# Patient Record
Sex: MALE | Race: WHITE | NOT HISPANIC OR LATINO | Employment: FULL TIME | RURAL
[De-identification: names, ages, dates, MRNs, and addresses within clinical notes are randomized per-mention and may not be internally consistent; named-entity substitution may affect disease eponyms.]

---

## 2018-03-24 ENCOUNTER — CONVERSION ENCOUNTER (OUTPATIENT)
Dept: FAMILY MEDICINE CLINIC | Facility: CLINIC | Age: 28
End: 2018-03-24

## 2018-03-24 LAB
ALBUMIN SERPL-MCNC: 5.1 G/DL (ref 3.6–5.1)
ALP SERPL-CCNC: 57 U/L (ref 40–115)
ALT SERPL-CCNC: 20 U/L (ref 9–46)
AMYLASE SERPL-CCNC: 33 U/L (ref 21–101)
AST SERPL-CCNC: 17 U/L (ref 10–40)
BASOPHILS # BLD AUTO: 27 CELLS/UL (ref 0–200)
BASOPHILS NFR BLD AUTO: 0.3 %
BILIRUB SERPL-MCNC: 0.6 MG/DL (ref 0.2–1.2)
BUN SERPL-MCNC: 18 MG/DL (ref 7–25)
BUN/CREAT SERPL: NORMAL (CALC) (ref 6–22)
CALCIUM SERPL-MCNC: 9.9 MG/DL (ref 8.6–10.3)
CHLORIDE SERPL-SCNC: 99 MMOL/L (ref 98–110)
CHOLEST SERPL-MCNC: 177 MG/DL
CHOLEST/HDLC SERPL: 6.1 (CALC)
CONV CO2: 28 MMOL/L (ref 20–31)
CONV TOTAL PROTEIN: 8 G/DL (ref 6.1–8.1)
CREAT UR-MCNC: 0.94 MG/DL (ref 0.6–1.35)
EOSINOPHIL # BLD AUTO: 1.3 %
EOSINOPHIL # BLD AUTO: 117 CELLS/UL (ref 15–500)
ERYTHROCYTE [DISTWIDTH] IN BLOOD BY AUTOMATED COUNT: 12.4 % (ref 11–15)
GLOBULIN UR ELPH-MCNC: 2.9 MG/DL (ref 1.9–3.7)
GLUCOSE UR QL: 94 MG/DL (ref 65–99)
HCT VFR BLD AUTO: 48.4 % (ref 38.5–50)
HDLC SERPL-MCNC: 29 MG/DL
HGB BLD-MCNC: 16.2 G/DL (ref 13.2–17.1)
INSULIN SERPL-ACNC: 1.8 (CALC) (ref 1–2.5)
LDLC SERPL CALC-MCNC: 117 MG/DL
LIPASE SERPL-CCNC: 28 U/L (ref 7–60)
LYMPHOCYTES # BLD AUTO: 2844 CELLS/UL (ref 850–3900)
LYMPHOCYTES NFR BLD AUTO: 31.6 %
MCH RBC QN AUTO: 27.8 PG (ref 27–33)
MCHC RBC AUTO-ENTMCNC: 33.5 G/DL (ref 32–36)
MCV RBC AUTO: 83 FL (ref 80–100)
MONOCYTES # BLD AUTO: 684 CELLS/UL (ref 200–950)
MONOCYTES NFR BLD AUTO: 7.6 %
NEUTROPHILS # BLD AUTO: 5328 CELLS/UL (ref 1500–7800)
NEUTROPHILS NFR BLD AUTO: 59.2 %
NONHDLC SERPL-MCNC: 148 MG/DL
PLATELET # BLD AUTO: 353 10*3/UL (ref 140–400)
PMV BLD AUTO: 10.2 FL (ref 7.5–12.5)
POTASSIUM SERPL-SCNC: 4 MMOL/L (ref 3.5–5.3)
RBC # BLD AUTO: 5.83 MILLION/UL (ref 4.2–5.8)
SODIUM SERPL-SCNC: 136 MMOL/L (ref 135–146)
TRIGL SERPL-MCNC: 191 MG/DL
TSH SERPL-ACNC: 1.59 MIU/L (ref 0.4–4.5)
WBC # BLD AUTO: 9 10*3/UL (ref 3.8–10.8)

## 2019-06-18 DIAGNOSIS — F32.0 CURRENT MILD EPISODE OF MAJOR DEPRESSIVE DISORDER WITHOUT PRIOR EPISODE (HCC): Primary | ICD-10-CM

## 2019-06-18 DIAGNOSIS — F32.89 OTHER DEPRESSION: ICD-10-CM

## 2019-06-19 RX ORDER — CITALOPRAM 20 MG/1
TABLET ORAL
Qty: 30 TABLET | Refills: 2 | Status: SHIPPED | OUTPATIENT
Start: 2019-06-19 | End: 2021-01-12 | Stop reason: SDUPTHER

## 2020-02-03 PROBLEM — F41.9 ANXIETY: Status: ACTIVE | Noted: 2020-02-03

## 2020-02-03 PROBLEM — F40.00 AGORAPHOBIA: Status: ACTIVE | Noted: 2020-02-03

## 2020-12-18 ENCOUNTER — OFFICE VISIT (OUTPATIENT)
Dept: FAMILY MEDICINE CLINIC | Facility: CLINIC | Age: 30
End: 2020-12-18

## 2020-12-18 VITALS
DIASTOLIC BLOOD PRESSURE: 98 MMHG | HEART RATE: 115 BPM | TEMPERATURE: 98.4 F | BODY MASS INDEX: 28.72 KG/M2 | OXYGEN SATURATION: 98 % | RESPIRATION RATE: 16 BRPM | SYSTOLIC BLOOD PRESSURE: 138 MMHG | WEIGHT: 200.6 LBS | HEIGHT: 70 IN

## 2020-12-18 DIAGNOSIS — I10 ESSENTIAL HYPERTENSION: Primary | ICD-10-CM

## 2020-12-18 PROCEDURE — 99213 OFFICE O/P EST LOW 20 MIN: CPT | Performed by: FAMILY MEDICINE

## 2020-12-18 RX ORDER — LISINOPRIL 10 MG/1
10 TABLET ORAL DAILY
Qty: 30 TABLET | Refills: 1 | Status: SHIPPED | OUTPATIENT
Start: 2020-12-18 | End: 2021-01-12 | Stop reason: SDUPTHER

## 2020-12-18 RX ORDER — LISINOPRIL 2.5 MG/1
2.5 TABLET ORAL DAILY
COMMUNITY
Start: 2020-11-24 | End: 2020-12-18 | Stop reason: DRUGHIGH

## 2020-12-18 NOTE — PROGRESS NOTES
Anders Moore is a 30 y.o. male.     Chief Complaint   Patient presents with   • Hypertension   • Anxiety       Pt seen at urgent care by Dr Arnold, started on lsiinopril 2.5 mg qd.   Pt here for follow up. BP still elevated    Hypertension  This is a new problem. The current episode started more than 1 month ago. The problem has been waxing and waning since onset. The problem is controlled. Associated symptoms include blurred vision and headaches. Pertinent negatives include no chest pain, malaise/fatigue, neck pain, shortness of breath or sweats. There are no associated agents to hypertension. Risk factors for coronary artery disease include smoking/tobacco exposure and stress. Past treatments include beta blockers. Current antihypertension treatment includes beta blockers. The current treatment provides moderate improvement. There are no compliance problems.         The following portions of the patient's history were reviewed and updated as appropriate: allergies, current medications, past family history, past medical history, past social history, past surgical history and problem list.    Allergies:  No Known Allergies    Social History:  Social History     Socioeconomic History   • Marital status: Single     Spouse name: Not on file   • Number of children: Not on file   • Years of education: Not on file   • Highest education level: Not on file   Tobacco Use   • Smoking status: Former Smoker   • Smokeless tobacco: Current User     Types: Chew   • Tobacco comment: 1/4 can daily; smoke exposure daily at work   Substance and Sexual Activity   • Alcohol use: Yes     Comment: Occasional alcohol use. 5-6 beers every 2-3 months   • Drug use: Never   • Sexual activity: Yes       Family History:  Family History   Problem Relation Age of Onset   • Coronary artery disease Mother 52        and/or Hypertension; valve repair at 52 living at 55.   • Stroke Mother 52   • Hyperlipidemia Mother         lipid in future  "  • Hypertension Father    • Anxiety disorder Brother    • Hypertension Maternal Grandmother    • Diabetes Maternal Grandmother    • Hypertension Maternal Grandfather    • Colon cancer Paternal Grandmother    • Diabetes Paternal Grandmother    • Coronary artery disease Paternal Grandfather         and/or Hypertension   • Coronary artery disease Maternal Uncle         and/or Hypertension       Past Medical History :  Patient Active Problem List   Diagnosis   • Agoraphobia   • Anxiety       Medication List:    Current Outpatient Medications:   •  citalopram (CeleXA) 20 MG tablet, TAKE 1 TABLET BY MOUTH EVERY DAY, Disp: 30 tablet, Rfl: 2  •  lisinopril (PRINIVIL,ZESTRIL) 10 MG tablet, Take 1 tablet by mouth Daily., Disp: 30 tablet, Rfl: 1    Past Surgical History:  History reviewed. No pertinent surgical history.    Review of Systems:  Review of Systems   Constitutional: Negative for activity change, appetite change, fatigue, fever and malaise/fatigue.   HENT: Negative for ear pain, swollen glands and voice change.    Eyes: Positive for blurred vision. Negative for visual disturbance.   Respiratory: Negative for shortness of breath and wheezing.    Cardiovascular: Negative for chest pain and leg swelling.   Gastrointestinal: Negative for abdominal pain, blood in stool, constipation, diarrhea, nausea and vomiting.   Endocrine: Negative for polydipsia and polyuria.   Genitourinary: Negative for dysuria, frequency and hematuria.   Musculoskeletal: Negative for joint swelling, neck pain and neck stiffness.   Skin: Negative for rash and bruise.   Neurological: Negative for weakness, numbness and headache.   Psychiatric/Behavioral: Negative for suicidal ideas and depressed mood.       Physical Exam:  Vital Signs:  Visit Vitals  /98 (BP Location: Left arm)   Pulse 115   Temp 98.4 °F (36.9 °C) (Temporal)   Resp 16   Ht 177.8 cm (70\")   Wt 91 kg (200 lb 9.6 oz)   SpO2 98%   BMI 28.78 kg/m²       Physical " Exam  Constitutional:       Appearance: He is well-developed.   HENT:      Head: Normocephalic and atraumatic.      Right Ear: External ear normal.      Left Ear: External ear normal.      Nose: Nose normal.   Eyes:      Pupils: Pupils are equal, round, and reactive to light.   Neck:      Musculoskeletal: Normal range of motion and neck supple.   Cardiovascular:      Rate and Rhythm: Normal rate and regular rhythm.      Heart sounds: Normal heart sounds.   Pulmonary:      Effort: Pulmonary effort is normal.      Breath sounds: Normal breath sounds.   Abdominal:      General: Bowel sounds are normal.      Palpations: Abdomen is soft.   Musculoskeletal: Normal range of motion.   Skin:     General: Skin is warm and dry.   Neurological:      Mental Status: He is alert and oriented to person, place, and time.   Psychiatric:         Behavior: Behavior normal.         Thought Content: Thought content normal.         Judgment: Judgment normal.         Assessment and Plan:  Problems Addressed this Visit     None      Visit Diagnoses     Essential hypertension    -  Primary    Relevant Medications    lisinopril (PRINIVIL,ZESTRIL) 10 MG tablet      Diagnoses       Codes Comments    Essential hypertension    -  Primary ICD-10-CM: I10  ICD-9-CM: 401.9        Increase lisinopril to 10 mg qd.  HR noted, may benefit from ekg and beta blocker if persistent. Pulse regular by auscultation today.  Medication and medication adverse effects discussed.  Drug education given and explained to patient. Patient verbalized understanding.  Differential diagnosis discussed.   Follow-up in 2 weeks if not better.  Follow-up sooner for worsening symptoms or for any concerns.    Recommended follow-up for full physical examination and routine health maintanence.  Follow up with Dr Gutierrez in a couple of weeks for PE and BP reeval.      An After Visit Summary and PPPS were given to the patient.             I wore protective equipment throughout this  patient encounter to include mask and eye protection. Hand hygiene was performed before donning protective equipment and after removal when leaving the room.

## 2021-01-12 ENCOUNTER — OFFICE VISIT (OUTPATIENT)
Dept: FAMILY MEDICINE CLINIC | Facility: CLINIC | Age: 31
End: 2021-01-12

## 2021-01-12 VITALS
SYSTOLIC BLOOD PRESSURE: 124 MMHG | DIASTOLIC BLOOD PRESSURE: 90 MMHG | OXYGEN SATURATION: 100 % | HEIGHT: 70 IN | WEIGHT: 199.8 LBS | BODY MASS INDEX: 28.6 KG/M2 | HEART RATE: 98 BPM | TEMPERATURE: 97.3 F | RESPIRATION RATE: 18 BRPM

## 2021-01-12 DIAGNOSIS — R07.9 CHEST PAIN, UNSPECIFIED TYPE: Primary | ICD-10-CM

## 2021-01-12 DIAGNOSIS — F32.89 OTHER DEPRESSION: ICD-10-CM

## 2021-01-12 DIAGNOSIS — I10 ESSENTIAL HYPERTENSION: ICD-10-CM

## 2021-01-12 DIAGNOSIS — R06.02 SHORTNESS OF BREATH: ICD-10-CM

## 2021-01-12 DIAGNOSIS — F41.9 ANXIETY: ICD-10-CM

## 2021-01-12 PROCEDURE — 93000 ELECTROCARDIOGRAM COMPLETE: CPT | Performed by: FAMILY MEDICINE

## 2021-01-12 PROCEDURE — 99214 OFFICE O/P EST MOD 30 MIN: CPT | Performed by: FAMILY MEDICINE

## 2021-01-12 RX ORDER — LISINOPRIL 10 MG/1
10 TABLET ORAL DAILY
Qty: 30 TABLET | Refills: 1 | Status: SHIPPED | OUTPATIENT
Start: 2021-01-12 | End: 2021-02-09 | Stop reason: SDUPTHER

## 2021-01-12 RX ORDER — CITALOPRAM 20 MG/1
20 TABLET ORAL DAILY
Qty: 30 TABLET | Refills: 1 | Status: SHIPPED | OUTPATIENT
Start: 2021-01-12 | End: 2021-02-09 | Stop reason: SDUPTHER

## 2021-01-12 NOTE — ASSESSMENT & PLAN NOTE
New dx.  EKG done today, read by me, reviewed with pt. EKG showed NSR with sinus arrhythmia and intraventricular conduction delay and secondary changes.  Probable noncardiac but offered referral to cardiologist for second opinion he declines

## 2021-01-12 NOTE — PROGRESS NOTES
Subjective   Matt Moore is a 30 y.o. male.     Hypertension  This is a chronic problem. The problem is controlled. Associated symptoms include anxiety and shortness of breath. Pertinent negatives include no chest pain or palpitations. There are no associated agents to hypertension. The current treatment provides mild improvement.   Anxiety  Presents for follow-up visit. Symptoms include nervous/anxious behavior and shortness of breath. Patient reports no chest pain, nausea or palpitations. Symptoms occur most days. The severity of symptoms is causing significant distress. The quality of sleep is fair. Nighttime awakenings: none.     Compliance with medications is 0-25%.   Chest Pain   This is a new problem. The current episode started more than 1 month ago. The problem occurs daily. The problem has been unchanged. The quality of the pain is described as pressure. Associated symptoms include numbness (1 x in the finger tips) and shortness of breath. Pertinent negatives include no diaphoresis, nausea, near-syncope, palpitations or syncope.        The following portions of the patient's history were reviewed and updated as appropriate: allergies, current medications, past family history, past medical history, past social history, past surgical history and problem list.    Family History   Problem Relation Age of Onset   • Coronary artery disease Mother 52        and/or Hypertension; valve repair at 52 living at 55.   • Stroke Mother 52   • Hyperlipidemia Mother         lipid in future   • Hypertension Father    • Anxiety disorder Brother    • Hypertension Maternal Grandmother    • Diabetes Maternal Grandmother    • Hypertension Maternal Grandfather    • Colon cancer Paternal Grandmother    • Diabetes Paternal Grandmother    • Coronary artery disease Paternal Grandfather         and/or Hypertension   • Coronary artery disease Maternal Uncle         and/or Hypertension       Social History     Tobacco Use   • Smoking  "status: Former Smoker     Quit date: 2019     Years since quittin.5   • Smokeless tobacco: Current User     Types: Chew   • Tobacco comment: 1/4 can daily; smoke exposure daily at work   Substance Use Topics   • Alcohol use: Yes     Frequency: Monthly or less     Drinks per session: 3 or 4     Binge frequency: Never     Comment: Occasional alcohol use. 5-6 beers every 2-3 months   • Drug use: Never       History reviewed. No pertinent surgical history.    Patient Active Problem List   Diagnosis   • Agoraphobia   • Anxiety   • Chest pain   • Shortness of breath   • Essential hypertension       No current outpatient medications on file prior to visit.     No current facility-administered medications on file prior to visit.        No Known Allergies    Review of Systems   Constitutional: Negative for diaphoresis, fatigue, unexpected weight gain and unexpected weight loss.   Respiratory: Positive for shortness of breath.    Cardiovascular: Negative for chest pain, palpitations, leg swelling, syncope and near-syncope.   Gastrointestinal: Negative for nausea.   Neurological: Positive for numbness (1 x in the finger tips). Negative for headache.   Psychiatric/Behavioral: The patient is nervous/anxious.        Objective   Visit Vitals  /90 (BP Location: Right arm, Patient Position: Sitting, Cuff Size: Adult)   Pulse 98   Temp 97.3 °F (36.3 °C) (Temporal)   Resp 18   Ht 177.8 cm (70\")   Wt 90.6 kg (199 lb 12.8 oz)   SpO2 100%   BMI 28.67 kg/m²     Physical Exam  Vitals signs and nursing note reviewed.   Constitutional:       Appearance: He is well-developed.   HENT:      Head: Normocephalic.   Neck:      Musculoskeletal: Neck supple.      Thyroid: No thyromegaly.      Vascular: No carotid bruit.      Trachea: Trachea normal.   Cardiovascular:      Rate and Rhythm: Normal rate and regular rhythm.      Heart sounds: No murmur. No friction rub. No gallop.    Pulmonary:      Effort: Pulmonary effort is normal. No " respiratory distress.      Breath sounds: Normal breath sounds. No wheezing.   Chest:      Chest wall: No tenderness.   Skin:     General: Skin is dry.      Findings: No rash.      Nails: There is no clubbing.     Neurological:      Mental Status: He is alert and oriented to person, place, and time.   Psychiatric:         Behavior: Behavior is cooperative.           Assessment/Plan .  Problem List Items Addressed This Visit        Medium    Anxiety    Current Assessment & Plan     Worse, advised to restart Celexa         Essential hypertension    Current Assessment & Plan     Improved, not at goal.  Encouraged to watch salt, exercise more and lose weight.              Unprioritized    Chest pain - Primary    Current Assessment & Plan     New dx.  EKG done today, read by me, reviewed with pt. EKG showed NSR with sinus arrhythmia and intraventricular conduction delay and secondary changes.  Probable noncardiac but offered referral to cardiologist for second opinion he declines         Relevant Orders    ECG 12 Lead    Ambulatory Referral to Cardiology    Shortness of breath    Current Assessment & Plan     Probably 2nd to anxiety--declines more work-up         Relevant Orders    Ambulatory Referral to Cardiology

## 2021-01-21 NOTE — PROGRESS NOTES
Date of Office Visit: 2021  Encounter Provider: Dr.Syed Jim  Place of Service: [facility] CARDIOLOGY [place]  Patient Name: Matt Moore  :1990  Donte Gutierrez MD    Chief Complaint   Patient presents with   • Chest Pain     consult   • Hypertension   • Shortness of Breath     History of Present Illness:    I am pleased to see Mr. Moore in my office today as a new consultation.    As you know, patient is 31-year-old white gentleman whose past medical history is significant for hypertension, tobacco abuse, alcohol abuse, who came today for symptom of chest pain and palpitation.    From last 1 month patient is having symptom of chest discomfort.  Patient described this as a dull ache in retrosternal region with radiation across the chest.  There is no correlation with exertion.  However he had few episode during exertion and also at rest.  Patient also complained of palpitation described as a racing of the heart.  Patient has not passed out.  Occasional dizzy spell noted.  Patient denies any orthopnea or PND.  No leg edema noted.    Patient has decrease his smoking but he was a 1 pack of cigarettes per day smoker about a year ago.  Patient was a heavy drinker in the past drinking rum and beer.    I would recommend that patient should proceed with blood pressure monitoring at home.  I would consider stress stress test with echocardiography.  Stress echocardiogram would be arranged.  I would also consider Toprol-XL in future for better control of blood pressure.        Past Medical History:   Diagnosis Date   • Agoraphobia    • Anxiety    • Diarrhea, unspecified    • HDL deficiency    • Hypertension    • Malaise and fatigue    • Near syncope    • Overweight    • Shortness of breath    • Skin abnormality          History reviewed. No pertinent surgical history.        Current Outpatient Medications:   •  citalopram (CeleXA) 20 MG tablet, Take 1 tablet by mouth Daily., Disp: 30 tablet, Rfl: 1  •   "lisinopril (PRINIVIL,ZESTRIL) 10 MG tablet, Take 1 tablet by mouth Daily., Disp: 30 tablet, Rfl: 1      Social History     Socioeconomic History   • Marital status: Single     Spouse name: Not on file   • Number of children: Not on file   • Years of education: Not on file   • Highest education level: Not on file   Tobacco Use   • Smoking status: Current Some Day Smoker     Types: Cigarettes     Last attempt to quit: 2019     Years since quittin.5   • Smokeless tobacco: Current User     Types: Chew   • Tobacco comment: 1/4 can daily; smoke exposure daily at work   Substance and Sexual Activity   • Alcohol use: Yes     Frequency: Monthly or less     Drinks per session: 3 or 4     Binge frequency: Never     Comment: Occasional alcohol use. 5-6 beers every 2-3 months   • Drug use: Never   • Sexual activity: Yes     Partners: Female     Birth control/protection: Condom         Review of Systems   Constitution: Negative for chills and fever.   HENT: Negative for ear discharge and nosebleeds.    Eyes: Negative for discharge and redness.   Cardiovascular: Positive for chest pain. Negative for orthopnea, palpitations, paroxysmal nocturnal dyspnea and syncope.   Respiratory: Positive for shortness of breath. Negative for cough and wheezing.    Endocrine: Negative for heat intolerance.   Skin: Negative for rash.   Musculoskeletal: Negative for arthritis and myalgias.   Gastrointestinal: Negative for abdominal pain, melena, nausea and vomiting.   Genitourinary: Negative for dysuria and hematuria.   Neurological: Negative for dizziness, light-headedness, numbness and tremors.   Psychiatric/Behavioral: Negative for depression. The patient is nervous/anxious.        Procedures    Procedures    No orders to display           Objective:    /92   Pulse 94   Ht 177.8 cm (70\")   Wt 90.3 kg (199 lb)   BMI 28.55 kg/m²         Constitutional:       Appearance: Well-developed.   Eyes:      General: No scleral icterus.   "      Right eye: No discharge.   HENT:      Head: Normocephalic and atraumatic.   Neck:      Thyroid: No thyromegaly.      Lymphadenopathy: No cervical adenopathy.   Pulmonary:      Effort: Pulmonary effort is normal. No respiratory distress.      Breath sounds: Normal breath sounds. No wheezing. No rales.   Cardiovascular:      Normal rate. Regular rhythm.      No gallop.   Abdominal:      Tenderness: There is no abdominal tenderness.   Skin:     Findings: No erythema or rash.   Neurological:      Mental Status: Alert and oriented to person, place, and time.             Assessment:       Diagnosis Plan   1. Shortness of breath  Adult Stress Echo W/ Cont or Stress Agent if Necessary Per Protocol   2. Chest pain, unspecified type  Adult Stress Echo W/ Cont or Stress Agent if Necessary Per Protocol   3. Essential hypertension  Adult Stress Echo W/ Cont or Stress Agent if Necessary Per Protocol   4. Palpitations  Adult Stress Echo W/ Cont or Stress Agent if Necessary Per Protocol            Plan:       Assessment and plan/MDM:    1.  Chest pain:    I would recommend to proceed with stress echocardiography:    2.  Hypertension:    Blood pressure is still elevated I would recommend that patient should monitor the blood pressure and bring the logbook on next visit.  I will consider beta-blockers.    3.  Palpitation:    At present patient is in sinus rhythm.  After the cardiac testing, I would consider beta-blockers    4.  Shortness of breath:    I would recommend an echocardiogram

## 2021-01-22 ENCOUNTER — OFFICE VISIT (OUTPATIENT)
Dept: CARDIOLOGY | Facility: CLINIC | Age: 31
End: 2021-01-22

## 2021-01-22 VITALS
WEIGHT: 199 LBS | HEART RATE: 94 BPM | HEIGHT: 70 IN | SYSTOLIC BLOOD PRESSURE: 144 MMHG | BODY MASS INDEX: 28.49 KG/M2 | DIASTOLIC BLOOD PRESSURE: 92 MMHG

## 2021-01-22 DIAGNOSIS — I10 ESSENTIAL HYPERTENSION: ICD-10-CM

## 2021-01-22 DIAGNOSIS — R07.9 CHEST PAIN, UNSPECIFIED TYPE: ICD-10-CM

## 2021-01-22 DIAGNOSIS — R06.02 SHORTNESS OF BREATH: Primary | ICD-10-CM

## 2021-01-22 DIAGNOSIS — R00.2 PALPITATIONS: ICD-10-CM

## 2021-01-22 PROCEDURE — 99204 OFFICE O/P NEW MOD 45 MIN: CPT | Performed by: INTERNAL MEDICINE

## 2021-01-29 ENCOUNTER — HOSPITAL ENCOUNTER (OUTPATIENT)
Dept: CARDIOLOGY | Facility: HOSPITAL | Age: 31
Discharge: HOME | End: 2021-01-29
Admitting: INTERNAL MEDICINE

## 2021-01-29 VITALS
DIASTOLIC BLOOD PRESSURE: 84 MMHG | HEART RATE: 78 BPM | HEIGHT: 70 IN | WEIGHT: 199 LBS | BODY MASS INDEX: 28.49 KG/M2 | SYSTOLIC BLOOD PRESSURE: 126 MMHG

## 2021-01-29 DIAGNOSIS — R06.02 SHORTNESS OF BREATH: ICD-10-CM

## 2021-01-29 DIAGNOSIS — R00.2 PALPITATIONS: ICD-10-CM

## 2021-01-29 DIAGNOSIS — R07.9 CHEST PAIN, UNSPECIFIED TYPE: ICD-10-CM

## 2021-01-29 DIAGNOSIS — I10 ESSENTIAL HYPERTENSION: ICD-10-CM

## 2021-01-29 PROCEDURE — 93350 STRESS TTE ONLY: CPT

## 2021-01-29 PROCEDURE — 93017 CV STRESS TEST TRACING ONLY: CPT

## 2021-01-29 PROCEDURE — 93352 ADMIN ECG CONTRAST AGENT: CPT | Performed by: INTERNAL MEDICINE

## 2021-01-29 PROCEDURE — 93325 DOPPLER ECHO COLOR FLOW MAPG: CPT | Performed by: INTERNAL MEDICINE

## 2021-01-29 PROCEDURE — 93320 DOPPLER ECHO COMPLETE: CPT | Performed by: INTERNAL MEDICINE

## 2021-01-29 PROCEDURE — 25010000002 SULFUR HEXAFLUORIDE MICROSPH 60.7-25 MG RECONSTITUTED SUSPENSION: Performed by: INTERNAL MEDICINE

## 2021-01-29 PROCEDURE — 93320 DOPPLER ECHO COMPLETE: CPT

## 2021-01-29 PROCEDURE — 93350 STRESS TTE ONLY: CPT | Performed by: INTERNAL MEDICINE

## 2021-01-29 PROCEDURE — 93018 CV STRESS TEST I&R ONLY: CPT | Performed by: NURSE PRACTITIONER

## 2021-01-29 PROCEDURE — 93325 DOPPLER ECHO COLOR FLOW MAPG: CPT

## 2021-01-29 RX ADMIN — SULFUR HEXAFLUORIDE 2 ML: KIT at 11:51

## 2021-01-31 LAB
BH CV ECHO MEAS - ACS: 2.3 CM
BH CV ECHO MEAS - AO MAX PG (FULL): 1.2 MMHG
BH CV ECHO MEAS - AO MAX PG: 3.9 MMHG
BH CV ECHO MEAS - AO MEAN PG (FULL): 1.1 MMHG
BH CV ECHO MEAS - AO MEAN PG: 2.3 MMHG
BH CV ECHO MEAS - AO ROOT AREA (BSA CORRECTED): 1.5
BH CV ECHO MEAS - AO ROOT AREA: 8 CM^2
BH CV ECHO MEAS - AO ROOT DIAM: 3.2 CM
BH CV ECHO MEAS - AO V2 MAX: 98.2 CM/SEC
BH CV ECHO MEAS - AO V2 MEAN: 74.2 CM/SEC
BH CV ECHO MEAS - AO V2 VTI: 20.2 CM
BH CV ECHO MEAS - ASC AORTA: 2.6 CM
BH CV ECHO MEAS - AVA(I,A): 2.7 CM^2
BH CV ECHO MEAS - AVA(I,D): 2.7 CM^2
BH CV ECHO MEAS - AVA(V,A): 3.1 CM^2
BH CV ECHO MEAS - AVA(V,D): 3.1 CM^2
BH CV ECHO MEAS - BSA(HAYCOCK): 2.1 M^2
BH CV ECHO MEAS - BSA: 2.1 M^2
BH CV ECHO MEAS - BZI_BMI: 28.6 KILOGRAMS/M^2
BH CV ECHO MEAS - BZI_METRIC_HEIGHT: 177.8 CM
BH CV ECHO MEAS - BZI_METRIC_WEIGHT: 90.3 KG
BH CV ECHO MEAS - EDV(CUBED): 102.8 ML
BH CV ECHO MEAS - EDV(MOD-SP2): 86 ML
BH CV ECHO MEAS - EDV(MOD-SP4): 99 ML
BH CV ECHO MEAS - EDV(TEICH): 101.6 ML
BH CV ECHO MEAS - EF(CUBED): 65.2 %
BH CV ECHO MEAS - EF(MOD-BP): 60 %
BH CV ECHO MEAS - EF(MOD-SP2): 57.3 %
BH CV ECHO MEAS - EF(MOD-SP4): 59.5 %
BH CV ECHO MEAS - EF(TEICH): 56.7 %
BH CV ECHO MEAS - ESV(CUBED): 35.8 ML
BH CV ECHO MEAS - ESV(MOD-SP2): 36.7 ML
BH CV ECHO MEAS - ESV(MOD-SP4): 40.1 ML
BH CV ECHO MEAS - ESV(TEICH): 44 ML
BH CV ECHO MEAS - FS: 29.6 %
BH CV ECHO MEAS - IVS/LVPW: 1.1
BH CV ECHO MEAS - IVSD: 1.2 CM
BH CV ECHO MEAS - LA DIMENSION(2D): 3 CM
BH CV ECHO MEAS - LV DIASTOLIC VOL/BSA (35-75): 47.5 ML/M^2
BH CV ECHO MEAS - LV MASS(C)D: 195.2 GRAMS
BH CV ECHO MEAS - LV MASS(C)DI: 93.7 GRAMS/M^2
BH CV ECHO MEAS - LV MAX PG: 2.6 MMHG
BH CV ECHO MEAS - LV MEAN PG: 1.3 MMHG
BH CV ECHO MEAS - LV SYSTOLIC VOL/BSA (12-30): 19.3 ML/M^2
BH CV ECHO MEAS - LV V1 MAX: 81.3 CM/SEC
BH CV ECHO MEAS - LV V1 MEAN: 53.3 CM/SEC
BH CV ECHO MEAS - LV V1 VTI: 14.5 CM
BH CV ECHO MEAS - LVIDD: 4.7 CM
BH CV ECHO MEAS - LVIDS: 3.3 CM
BH CV ECHO MEAS - LVOT AREA: 3.8 CM^2
BH CV ECHO MEAS - LVOT DIAM: 2.2 CM
BH CV ECHO MEAS - LVPWD: 1.1 CM
BH CV ECHO MEAS - MV A MAX VEL: 50.8 CM/SEC
BH CV ECHO MEAS - MV DEC SLOPE: 268.5 CM/SEC^2
BH CV ECHO MEAS - MV DEC TIME: 0.22 SEC
BH CV ECHO MEAS - MV E MAX VEL: 59.9 CM/SEC
BH CV ECHO MEAS - MV E/A: 1.2
BH CV ECHO MEAS - MV MAX PG: 1.4 MMHG
BH CV ECHO MEAS - MV MEAN PG: 0.86 MMHG
BH CV ECHO MEAS - MV V2 MAX: 59.7 CM/SEC
BH CV ECHO MEAS - MV V2 MEAN: 44.8 CM/SEC
BH CV ECHO MEAS - MV V2 VTI: 15.3 CM
BH CV ECHO MEAS - MVA(VTI): 3.6 CM^2
BH CV ECHO MEAS - PA MAX PG: 3.9 MMHG
BH CV ECHO MEAS - PA MEAN PG: 2.3 MMHG
BH CV ECHO MEAS - PA V2 MAX: 98.2 CM/SEC
BH CV ECHO MEAS - PA V2 MEAN: 72.1 CM/SEC
BH CV ECHO MEAS - PA V2 VTI: 19.3 CM
BH CV ECHO MEAS - PI END-D VEL: 101.1 CM/SEC
BH CV ECHO MEAS - PI MAX PG: 8.2 MMHG
BH CV ECHO MEAS - PI MAX VEL: 143.4 CM/SEC
BH CV ECHO MEAS - PULM DIAS VEL: 36 CM/SEC
BH CV ECHO MEAS - PULM S/D: 1.1
BH CV ECHO MEAS - PULM SYS VEL: 41.1 CM/SEC
BH CV ECHO MEAS - RVDD: 2.7 CM
BH CV ECHO MEAS - RVOT AREA: 4.3 CM^2
BH CV ECHO MEAS - RVOT DIAM: 2.3 CM
BH CV ECHO MEAS - SI(AO): 77.6 ML/M^2
BH CV ECHO MEAS - SI(CUBED): 32.2 ML/M^2
BH CV ECHO MEAS - SI(LVOT): 26.3 ML/M^2
BH CV ECHO MEAS - SI(MOD-SP2): 23.7 ML/M^2
BH CV ECHO MEAS - SI(MOD-SP4): 28.3 ML/M^2
BH CV ECHO MEAS - SI(TEICH): 27.6 ML/M^2
BH CV ECHO MEAS - SV(AO): 161.7 ML
BH CV ECHO MEAS - SV(CUBED): 67 ML
BH CV ECHO MEAS - SV(LVOT): 54.8 ML
BH CV ECHO MEAS - SV(MOD-SP2): 49.3 ML
BH CV ECHO MEAS - SV(MOD-SP4): 58.9 ML
BH CV ECHO MEAS - SV(TEICH): 57.6 ML
BH CV STRESS BP STAGE 1: NORMAL
BH CV STRESS BP STAGE 2: NORMAL
BH CV STRESS BP STAGE 3: NORMAL
BH CV STRESS DURATION MIN STAGE 1: 3
BH CV STRESS DURATION MIN STAGE 2: 3
BH CV STRESS DURATION MIN STAGE 3: 3
BH CV STRESS DURATION SEC STAGE 1: 0
BH CV STRESS DURATION SEC STAGE 2: 0
BH CV STRESS DURATION SEC STAGE 3: 0
BH CV STRESS GRADE STAGE 1: 10
BH CV STRESS GRADE STAGE 2: 12
BH CV STRESS GRADE STAGE 3: 14
BH CV STRESS HR STAGE 1: 129
BH CV STRESS HR STAGE 2: 145
BH CV STRESS HR STAGE 3: 178
BH CV STRESS METS STAGE 1: 5
BH CV STRESS METS STAGE 2: 7.5
BH CV STRESS METS STAGE 3: 10
BH CV STRESS PROTOCOL 1: NORMAL
BH CV STRESS RECOVERY BP: NORMAL MMHG
BH CV STRESS RECOVERY HR: 106 BPM
BH CV STRESS SPEED STAGE 1: 1.7
BH CV STRESS SPEED STAGE 2: 2.5
BH CV STRESS SPEED STAGE 3: 3.4
BH CV STRESS STAGE 1: 1
BH CV STRESS STAGE 2: 2
BH CV STRESS STAGE 3: 3
PERCENT MAX PREDICTED HR: 93.19 %
STRESS BASELINE BP: NORMAL MMHG
STRESS BASELINE HR: 95 BPM
STRESS PERCENT HR: 110 %
STRESS POST ESTIMATED WORKLOAD: 10.1 METS
STRESS POST EXERCISE DUR MIN: 9 MIN
STRESS POST EXERCISE DUR SEC: 0 SEC
STRESS POST PEAK BP: NORMAL MMHG
STRESS POST PEAK HR: 178 BPM

## 2021-02-01 ENCOUNTER — TELEPHONE (OUTPATIENT)
Dept: CARDIOLOGY | Facility: CLINIC | Age: 31
End: 2021-02-01

## 2021-02-03 ENCOUNTER — OFFICE VISIT (OUTPATIENT)
Dept: FAMILY MEDICINE CLINIC | Facility: CLINIC | Age: 31
End: 2021-02-03

## 2021-02-03 VITALS
SYSTOLIC BLOOD PRESSURE: 119 MMHG | HEIGHT: 70 IN | DIASTOLIC BLOOD PRESSURE: 81 MMHG | HEART RATE: 79 BPM | TEMPERATURE: 97.7 F | OXYGEN SATURATION: 100 % | RESPIRATION RATE: 15 BRPM | WEIGHT: 195.6 LBS | BODY MASS INDEX: 28 KG/M2

## 2021-02-03 DIAGNOSIS — R53.81 MALAISE AND FATIGUE: ICD-10-CM

## 2021-02-03 DIAGNOSIS — F41.9 ANXIETY: ICD-10-CM

## 2021-02-03 DIAGNOSIS — R53.83 MALAISE AND FATIGUE: ICD-10-CM

## 2021-02-03 DIAGNOSIS — E78.2 MIXED HYPERLIPIDEMIA: ICD-10-CM

## 2021-02-03 DIAGNOSIS — R06.02 SHORTNESS OF BREATH: ICD-10-CM

## 2021-02-03 DIAGNOSIS — I10 ESSENTIAL HYPERTENSION: ICD-10-CM

## 2021-02-03 DIAGNOSIS — R07.9 CHEST PAIN, UNSPECIFIED TYPE: Primary | ICD-10-CM

## 2021-02-03 DIAGNOSIS — M19.90 ARTHRITIS: ICD-10-CM

## 2021-02-03 DIAGNOSIS — Z23 NEED FOR TDAP VACCINATION: ICD-10-CM

## 2021-02-03 PROCEDURE — 90715 TDAP VACCINE 7 YRS/> IM: CPT | Performed by: FAMILY MEDICINE

## 2021-02-03 PROCEDURE — 99214 OFFICE O/P EST MOD 30 MIN: CPT | Performed by: FAMILY MEDICINE

## 2021-02-03 PROCEDURE — 90471 IMMUNIZATION ADMIN: CPT | Performed by: FAMILY MEDICINE

## 2021-02-03 NOTE — PROGRESS NOTES
Subjective   Matt Moore is a 31 y.o. male.     Tachycardia    Chest Pain   This is a recurrent problem. The current episode started 1 to 4 weeks ago. The onset quality is gradual. The problem occurs intermittently. The problem has been gradually improving. The pain is mild. Pertinent negatives include no palpitations or shortness of breath.   Hypertension  This is a chronic problem. The current episode started more than 1 month ago. The problem has been gradually improving since onset. Pertinent negatives include no chest pain, palpitations or shortness of breath. Risk factors for coronary artery disease include dyslipidemia.   Shortness of Breath  This is a recurrent problem. The current episode started 1 to 4 weeks ago. The problem occurs intermittently. The problem has been gradually improving. Pertinent negatives include no chest pain. The symptoms are aggravated by any activity.        The following portions of the patient's history were reviewed and updated as appropriate: current medications, past family history, past medical history, past social history, past surgical history and problem list.    Family History   Problem Relation Age of Onset   • Coronary artery disease Mother 52        and/or Hypertension; valve repair at 52 living at 55.   • Stroke Mother 52   • Hyperlipidemia Mother         lipid in future   • Hypertension Father    • Other Father         detatched retna bilateral   • Anxiety disorder Brother    • Hypertension Maternal Grandmother    • Diabetes Maternal Grandmother    • Hypertension Maternal Grandfather    • Colon cancer Paternal Grandmother    • Diabetes Paternal Grandmother    • Coronary artery disease Paternal Grandfather         and/or Hypertension   • Coronary artery disease Maternal Uncle         and/or Hypertension       Social History     Tobacco Use   • Smoking status: Current Some Day Smoker     Types: Cigarettes   • Smokeless tobacco: Current User     Types: Chew   • Tobacco  "comment: smokes occasionally   Substance Use Topics   • Alcohol use: Yes     Frequency: Monthly or less     Drinks per session: 1 or 2     Binge frequency: Never   • Drug use: Never       No past surgical history on file.    Patient Active Problem List   Diagnosis   • Agoraphobia   • Anxiety   • Essential hypertension   • Skin abnormality   • Mixed hyperlipidemia   • Malaise and fatigue   • Arthritis   • Encounter for general adult medical examination with abnormal findings       No current outpatient medications on file prior to visit.     No current facility-administered medications on file prior to visit.        No Known Allergies    Review of Systems   Constitutional: Negative for fatigue.   HENT: Negative for hearing loss.    Respiratory: Negative for shortness of breath.    Cardiovascular: Negative for chest pain and palpitations.   Genitourinary: Negative for frequency.        Nocturia   Musculoskeletal: Negative for joint swelling.   Neurological: Negative for memory problem.       Objective   Visit Vitals  /81 (BP Location: Right arm, Patient Position: Sitting, Cuff Size: Large Adult)   Pulse 79   Temp 97.7 °F (36.5 °C)   Resp 15   Ht 177.8 cm (70\")   Wt 88.7 kg (195 lb 9.6 oz)   SpO2 100%   BMI 28.07 kg/m²     Physical Exam  Vitals signs and nursing note reviewed.   Constitutional:       Appearance: He is well-developed.   HENT:      Head: Normocephalic.   Neck:      Musculoskeletal: Neck supple.      Thyroid: No thyromegaly.      Vascular: No carotid bruit.      Trachea: Trachea normal.   Cardiovascular:      Rate and Rhythm: Normal rate and regular rhythm.      Heart sounds: No murmur. No friction rub. No gallop.    Pulmonary:      Effort: Pulmonary effort is normal. No respiratory distress.      Breath sounds: Normal breath sounds. No wheezing.   Chest:      Chest wall: No tenderness.   Skin:     General: Skin is dry.      Findings: No rash.      Nails: There is no clubbing.     Neurological:      " Mental Status: He is alert and oriented to person, place, and time.   Psychiatric:         Behavior: Behavior is cooperative.           Assessment/Plan .  Problem List Items Addressed This Visit        Medium    Anxiety    Current Assessment & Plan     Doing well on Celexa.          Essential hypertension    Current Assessment & Plan     Improving; Encouraged to watch salt, exercise more and lose weight.  Patient tolerated Lisinopril well without side effects. I feel the benefits of the medication outweigh the risks.           Mixed hyperlipidemia    Current Assessment & Plan     Will draw labs today and discuss results at next visit.         Relevant Orders    Lipid Panel With / Chol / HDL Ratio (Completed)    Comprehensive Metabolic Panel (Completed)       Unprioritized    Arthritis    Current Assessment & Plan     Mild- Will draw labs today and discuss results at next visit.         Relevant Orders    C-reactive Protein (Completed)    Malaise and fatigue    Current Assessment & Plan     Mild- Will draw labs today and discuss results at next visit.         Relevant Orders    TSH (Completed)    CBC & Differential (Completed)      Other Visit Diagnoses     Chest pain, unspecified type    -  Primary    Improving the patient is scheduled to see cardiologist Dr. Jim on the 26.  If pain increases or changes he is to return immediately.    Shortness of breath        Need for Tdap vaccination        Relevant Orders    Tdap Vaccine Greater Than or Equal To 8yo IM (Completed)

## 2021-02-03 NOTE — ASSESSMENT & PLAN NOTE
Improving; Encouraged to watch salt, exercise more and lose weight.  Patient tolerated Lisinopril well without side effects. I feel the benefits of the medication outweigh the risks.

## 2021-02-04 LAB
ALBUMIN SERPL-MCNC: 4.7 G/DL (ref 4.1–5.2)
ALBUMIN/GLOB SERPL: 1.8 {RATIO} (ref 1.2–2.2)
ALP SERPL-CCNC: 70 IU/L (ref 39–117)
ALT SERPL-CCNC: 25 IU/L (ref 0–44)
AST SERPL-CCNC: 19 IU/L (ref 0–40)
BASOPHILS # BLD AUTO: 0 X10E3/UL (ref 0–0.2)
BASOPHILS NFR BLD AUTO: 1 %
BILIRUB SERPL-MCNC: 0.4 MG/DL (ref 0–1.2)
BUN SERPL-MCNC: 9 MG/DL (ref 6–20)
BUN/CREAT SERPL: 9 (ref 9–20)
CALCIUM SERPL-MCNC: 10.1 MG/DL (ref 8.7–10.2)
CHLORIDE SERPL-SCNC: 103 MMOL/L (ref 96–106)
CHOLEST SERPL-MCNC: 158 MG/DL (ref 100–199)
CHOLEST/HDLC SERPL: 4.9 RATIO (ref 0–5)
CO2 SERPL-SCNC: 25 MMOL/L (ref 20–29)
CREAT SERPL-MCNC: 1 MG/DL (ref 0.76–1.27)
CRP SERPL-MCNC: 1 MG/L (ref 0–10)
EOSINOPHIL # BLD AUTO: 0.1 X10E3/UL (ref 0–0.4)
EOSINOPHIL NFR BLD AUTO: 1 %
ERYTHROCYTE [DISTWIDTH] IN BLOOD BY AUTOMATED COUNT: 12.2 % (ref 11.6–15.4)
GLOBULIN SER CALC-MCNC: 2.6 G/DL (ref 1.5–4.5)
GLUCOSE SERPL-MCNC: 89 MG/DL (ref 65–99)
HCT VFR BLD AUTO: 43.8 % (ref 37.5–51)
HDLC SERPL-MCNC: 32 MG/DL
HGB BLD-MCNC: 15 G/DL (ref 13–17.7)
IMM GRANULOCYTES # BLD AUTO: 0 X10E3/UL (ref 0–0.1)
IMM GRANULOCYTES NFR BLD AUTO: 0 %
LDLC SERPL CALC-MCNC: 97 MG/DL (ref 0–99)
LYMPHOCYTES # BLD AUTO: 1.5 X10E3/UL (ref 0.7–3.1)
LYMPHOCYTES NFR BLD AUTO: 23 %
MCH RBC QN AUTO: 28.3 PG (ref 26.6–33)
MCHC RBC AUTO-ENTMCNC: 34.2 G/DL (ref 31.5–35.7)
MCV RBC AUTO: 83 FL (ref 79–97)
MONOCYTES # BLD AUTO: 0.4 X10E3/UL (ref 0.1–0.9)
MONOCYTES NFR BLD AUTO: 6 %
NEUTROPHILS # BLD AUTO: 4.5 X10E3/UL (ref 1.4–7)
NEUTROPHILS NFR BLD AUTO: 69 %
PLATELET # BLD AUTO: 340 X10E3/UL (ref 150–450)
POTASSIUM SERPL-SCNC: 4.6 MMOL/L (ref 3.5–5.2)
PROT SERPL-MCNC: 7.3 G/DL (ref 6–8.5)
RBC # BLD AUTO: 5.3 X10E6/UL (ref 4.14–5.8)
SODIUM SERPL-SCNC: 141 MMOL/L (ref 134–144)
TRIGL SERPL-MCNC: 163 MG/DL (ref 0–149)
TSH SERPL DL<=0.005 MIU/L-ACNC: 0.77 UIU/ML (ref 0.45–4.5)
VLDLC SERPL CALC-MCNC: 29 MG/DL (ref 5–40)
WBC # BLD AUTO: 6.5 X10E3/UL (ref 3.4–10.8)

## 2021-02-08 NOTE — ASSESSMENT & PLAN NOTE
Labs done 2-3-2021, read by me, reviewed with pt.  Trig. 163 down from 191, Tot. Chol. 158 down from 177, HDL 32 up from 29, LDL 97 down from 117  Improved.  Close to goal.  Encouraged to watch fatty intake, exercise more, and lose weight.   No medication    Is not getting adequate diet and exercise  Goals developed at last visit were not met because diet and exercise  Follow up in 3-4  months  Care management needs are self-addressed. . Self-management abilities addressed and patient is capable of managing his own disease.

## 2021-02-09 ENCOUNTER — OFFICE VISIT (OUTPATIENT)
Dept: FAMILY MEDICINE CLINIC | Facility: CLINIC | Age: 31
End: 2021-02-09

## 2021-02-09 VITALS
HEART RATE: 91 BPM | TEMPERATURE: 97.7 F | OXYGEN SATURATION: 98 % | RESPIRATION RATE: 18 BRPM | SYSTOLIC BLOOD PRESSURE: 119 MMHG | BODY MASS INDEX: 28.15 KG/M2 | DIASTOLIC BLOOD PRESSURE: 78 MMHG | HEIGHT: 70 IN | WEIGHT: 196.6 LBS

## 2021-02-09 DIAGNOSIS — M19.90 ARTHRITIS: ICD-10-CM

## 2021-02-09 DIAGNOSIS — L98.9 SKIN ABNORMALITY: ICD-10-CM

## 2021-02-09 DIAGNOSIS — R53.83 MALAISE AND FATIGUE: ICD-10-CM

## 2021-02-09 DIAGNOSIS — R53.81 MALAISE AND FATIGUE: ICD-10-CM

## 2021-02-09 DIAGNOSIS — F41.9 ANXIETY: ICD-10-CM

## 2021-02-09 DIAGNOSIS — I49.8 SINUS ARRHYTHMIA: ICD-10-CM

## 2021-02-09 DIAGNOSIS — E78.2 MIXED HYPERLIPIDEMIA: Primary | ICD-10-CM

## 2021-02-09 DIAGNOSIS — F32.89 OTHER DEPRESSION: ICD-10-CM

## 2021-02-09 DIAGNOSIS — F40.00 AGORAPHOBIA: ICD-10-CM

## 2021-02-09 DIAGNOSIS — I10 ESSENTIAL HYPERTENSION: ICD-10-CM

## 2021-02-09 DIAGNOSIS — Z00.01 ENCOUNTER FOR GENERAL ADULT MEDICAL EXAMINATION WITH ABNORMAL FINDINGS: ICD-10-CM

## 2021-02-09 PROCEDURE — 99395 PREV VISIT EST AGE 18-39: CPT | Performed by: FAMILY MEDICINE

## 2021-02-09 PROCEDURE — 99213 OFFICE O/P EST LOW 20 MIN: CPT | Performed by: FAMILY MEDICINE

## 2021-02-09 RX ORDER — CITALOPRAM 20 MG/1
20 TABLET ORAL DAILY
Qty: 90 TABLET | Refills: 3 | Status: SHIPPED | OUTPATIENT
Start: 2021-02-09 | End: 2021-09-21 | Stop reason: SDUPTHER

## 2021-02-09 RX ORDER — LISINOPRIL 10 MG/1
10 TABLET ORAL DAILY
Qty: 90 TABLET | Refills: 3 | Status: SHIPPED | OUTPATIENT
Start: 2021-02-09 | End: 2021-04-15 | Stop reason: SDUPTHER

## 2021-02-09 NOTE — ASSESSMENT & PLAN NOTE
Doing well.  Patient tolerated Celexa well without side effects. I feel the benefits of the medication outweigh the risks.

## 2021-02-09 NOTE — ASSESSMENT & PLAN NOTE
Doing well.  Patient tolerated Lisinopril well without side effects. I feel the benefits of the medication outweigh the risks.  Encouraged to watch salt, exercise more and lose weight.

## 2021-02-09 NOTE — PROGRESS NOTES
Subjective   Matt Moore is a 31 y.o. male here for his annual physical with me. Matt is here for coordination of medical care, to discuss health maintenance, disease prevention as well as to followup on medical problems. Patient has been followed by me since 1992. Patient's last CPE was 3-. Activity level is moderate. Exercises 0 per week. Appetite is good. Feels well with none complaints. Energy level is good. Sleeps well. Patient's last colonoscopy was never. He is advised to start having colonoscopies between ages of 45-50 Patient is doing routine self skin exam never. Patient is doing routine self-breast exams never. Patient is checking testicles never.    No results found for: PSA     Follow up Agoraphobia.    Hyperlipidemia  This is a chronic problem. The current episode started more than 1 year ago. The problem is uncontrolled. Recent lipid tests were reviewed and are high. Factors aggravating his hyperlipidemia include fatty foods. Pertinent negatives include no chest pain, myalgias or shortness of breath. He is currently on no antihyperlipidemic treatment. The current treatment provides mild improvement of lipids. There are no compliance problems.  Risk factors for coronary artery disease include dyslipidemia and hypertension.   Skin Problem  This is a chronic problem. The current episode started more than 1 year ago. The problem occurs constantly. The problem has been unchanged. Associated symptoms include arthralgias and fatigue. Pertinent negatives include no abdominal pain, chest pain, chills, congestion, coughing, fever, joint swelling, myalgias, nausea, neck pain, rash, sore throat, vomiting or weakness. Nothing aggravates the symptoms. He has tried nothing for the symptoms. The treatment provided no relief.   Fatigue  This is a chronic problem. The current episode started more than 1 month ago. The problem occurs intermittently. The problem has been unchanged. Associated symptoms include  arthralgias and fatigue. Pertinent negatives include no abdominal pain, chest pain, chills, congestion, coughing, fever, joint swelling, myalgias, nausea, neck pain, rash, sore throat, vomiting or weakness. Nothing aggravates the symptoms. He has tried nothing for the symptoms. The treatment provided no relief.   Arthritis  Presents for follow-up visit. He reports no joint swelling. The symptoms have been stable. Associated symptoms include fatigue. Pertinent negatives include no diarrhea, dysuria, fever, rash or weight loss.        The following portions of the patient's history were reviewed and updated as appropriate: allergies, current medications, past family history, past medical history, past social history, past surgical history and problem list.    Past Medical History:   Diagnosis Date   • Agoraphobia    • Anxiety    • HDL deficiency    • Malaise and fatigue    • Near syncope    • Overweight    • Skin abnormality        Family History   Problem Relation Age of Onset   • Coronary artery disease Mother 52        and/or Hypertension; valve repair at 52 living at 55.   • Stroke Mother 52   • Hyperlipidemia Mother         lipid in future   • Hypertension Father    • Other Father         detatched retna bilateral   • Anxiety disorder Brother    • Hypertension Maternal Grandmother    • Diabetes Maternal Grandmother    • Hypertension Maternal Grandfather    • Colon cancer Paternal Grandmother    • Diabetes Paternal Grandmother    • Coronary artery disease Paternal Grandfather         and/or Hypertension   • Coronary artery disease Maternal Uncle         and/or Hypertension       Social History     Socioeconomic History   • Marital status: Single     Spouse name: Not on file   • Number of children: Not on file   • Years of education: Not on file   • Highest education level: Not on file   Tobacco Use   • Smoking status: Current Some Day Smoker     Types: Cigarettes   • Smokeless tobacco: Current User     Types: Chew    • Tobacco comment: smokes occasionally   Substance and Sexual Activity   • Alcohol use: Yes     Frequency: Monthly or less     Drinks per session: 1 or 2     Binge frequency: Never   • Drug use: Never   • Sexual activity: Yes     Partners: Female     Birth control/protection: Condom   Social History Narrative    Never , lives alone, he is dating for the last 6 months, sexually actives uses condoms.  Works at [employer],  since 2014 works 48 hours weekly, high stress.  Exercise none.  Caffeine Tea 1 gallon daily and 20 oz soda daily.  Always wears seatbelts.  Hobbies hunting, fishing, riding motorcycles and hunting trips, play video games.       History reviewed. No pertinent surgical history.    No current outpatient medications on file prior to visit.     No current facility-administered medications on file prior to visit.        No Known Allergies    Review of Systems   Constitutional: Positive for fatigue. Negative for appetite change, chills, fever, unexpected weight gain and unexpected weight loss.   HENT: Negative for congestion, dental problem, ear discharge, ear pain, hearing loss, nosebleeds, postnasal drip, rhinorrhea, sinus pressure, sneezing, sore throat, tinnitus and voice change.         Last dental exam approx. 2017 Dr. Rodriguez-advised to follow   Eyes: Negative for blurred vision, double vision, pain, redness and visual disturbance.        Last Vision exam  Approx. 2017 Dr. Martinez-advised to follow   Respiratory: Negative for cough, shortness of breath, wheezing and stridor.    Cardiovascular: Negative for chest pain, palpitations and leg swelling.   Gastrointestinal: Negative for abdominal pain, anal bleeding, blood in stool, constipation, diarrhea, nausea, rectal pain, vomiting, GERD and indigestion.        7 BM weekly   Endocrine: Negative for cold intolerance, heat intolerance, polydipsia, polyphagia and polyuria.        Sex Drive  He is a 5  She is a 5  "  Genitourinary: Negative for difficulty urinating, dysuria, frequency, hematuria and urgency.   Musculoskeletal: Positive for arthralgias and arthritis. Negative for back pain, joint swelling, myalgias, neck pain and neck stiffness.   Skin: Positive for skin lesions. Negative for color change, dry skin and rash.   Neurological: Negative for dizziness, syncope, speech difficulty, weakness, light-headedness, headache and memory problem.   Hematological: Does not bruise/bleed easily.   Psychiatric/Behavioral: Negative for decreased concentration, sleep disturbance, depressed mood and stress. The patient is not nervous/anxious.        Objective   Visit Vitals  /78 (BP Location: Right arm, Patient Position: Sitting, Cuff Size: Large Adult)   Pulse 91   Temp 97.7 °F (36.5 °C) (Temporal)   Resp 18   Ht 177.8 cm (70\")   Wt 89.2 kg (196 lb 9.6 oz)   SpO2 98%   BMI 28.21 kg/m²     Physical Exam  Constitutional:       General: He is not in acute distress.     Appearance: He is well-developed. He is not diaphoretic.   HENT:      Head: Normocephalic.      Right Ear: Hearing, tympanic membrane, ear canal and external ear normal.      Left Ear: Hearing, tympanic membrane, ear canal and external ear normal.      Nose: Nose normal.      Mouth/Throat:      Lips: Pink.      Mouth: Mucous membranes are moist.      Pharynx: Oropharynx is clear. No oropharyngeal exudate.   Eyes:      General: Lids are normal. No scleral icterus.        Right eye: No discharge.         Left eye: No discharge.      Conjunctiva/sclera: Conjunctivae normal.      Pupils: Pupils are equal, round, and reactive to light.   Neck:      Musculoskeletal: Full passive range of motion without pain, normal range of motion and neck supple.      Trachea: Trachea normal.   Cardiovascular:      Rate and Rhythm: Normal rate and regular rhythm.      Heart sounds: Normal heart sounds. No murmur.   Pulmonary:      Effort: Pulmonary effort is normal.      Breath sounds: " Normal breath sounds. No wheezing.   Chest:      Chest wall: No tenderness.      Breasts:         Right: Normal.         Left: Normal.   Abdominal:      General: Bowel sounds are normal. There is no distension.      Palpations: Abdomen is soft. There is no mass.      Tenderness: There is no abdominal tenderness.      Hernia: No hernia is present.   Genitourinary:     Penis: Normal and circumcised. No tenderness.       Scrotum/Testes: Normal.      Prostate: Normal.      Rectum: Normal.   Musculoskeletal: Normal range of motion.         General: No tenderness or deformity.   Lymphadenopathy:      Cervical: No cervical adenopathy.   Skin:     General: Skin is warm and dry.      Findings: No erythema or rash.      Comments: Nevus scattered over the body.   Neurological:      General: No focal deficit present.      Mental Status: He is alert and oriented to person, place, and time.      Cranial Nerves: Cranial nerves are intact. No cranial nerve deficit.      Sensory: Sensation is intact. No sensory deficit.      Motor: Motor function is intact. No abnormal muscle tone.      Coordination: Coordination is intact. Coordination normal.      Gait: Gait is intact.      Deep Tendon Reflexes: Reflexes normal.   Psychiatric:         Behavior: Behavior normal.         Thought Content: Thought content normal.         Judgment: Judgment normal.         Assessment/Plan   Problem List Items Addressed This Visit        Medium    Agoraphobia    Current Assessment & Plan     Doing well.  Patient tolerated Celexa well without side effects. I feel the benefits of the medication outweigh the risks.         Relevant Medications    citalopram (CeleXA) 20 MG tablet    Anxiety    Current Assessment & Plan     Doing well.  Patient tolerated Celexa well without side effects. I feel the benefits of the medication outweigh the risks.         Essential hypertension    Current Assessment & Plan     Doing well.  Patient tolerated Lisinopril well  without side effects. I feel the benefits of the medication outweigh the risks.  Encouraged to watch salt, exercise more and lose weight.           Relevant Medications    lisinopril (PRINIVIL,ZESTRIL) 10 MG tablet    Mixed hyperlipidemia - Primary    Current Assessment & Plan     Labs done 2-3-2021, read by me, reviewed with pt.  Trig. 163 down from 191, Tot. Chol. 158 down from 177, HDL 32 up from 29, LDL 97 down from 117  Improved.  Close to goal.  Encouraged to watch fatty intake, exercise more, and lose weight.   No medication    Is not getting adequate diet and exercise  Goals developed at last visit were not met because diet and exercise  Follow up in 3-4  months  Care management needs are self-addressed. . Self-management abilities addressed and patient is capable of managing his own disease.           Relevant Orders    Lipid Panel With / Chol / HDL Ratio    Comprehensive Metabolic Panel       Unprioritized    Arthritis    Current Assessment & Plan     Mild.  Labs done 2-3-2021, read by me, reviewed with pt.  CRP was 1         Encounter for general adult medical examination with abnormal findings    Overview     Medical records thoroughly reviewed and summarized in emr, since last PE           Current Assessment & Plan     Encouraged to do self-breast exam, self-testicle exams, and self derm exams. Congratulated on using seat belts.  Encouraged to do annual physical exams.  Immunization status reviewed.           Malaise and fatigue    Current Assessment & Plan     Mild.  Labs done 2-3-2021, read by me, reviewed with pt.  CBC and TSH was normal         Sinus arrhythmia    Overview     Found on EKG January 12, 2021 will follow conservatively present.         Skin abnormality    Current Assessment & Plan     Scattered nevus's, watch for changes.           Other Visit Diagnoses     Other depression        Relevant Medications    citalopram (CeleXA) 20 MG tablet               Encouraged to check his skin,  testicles and breasts monthly. Reviewed exercising regularly, eating a balanced diet, immunizations and if due, patient counselled to check with insurance company for coverage; and regularly checking skin and breasts.

## 2021-02-14 PROBLEM — I45.9 INTRAVENTRICULAR CONDUCTION DELAY: Status: ACTIVE | Noted: 2021-02-14

## 2021-02-14 PROBLEM — I49.8 SINUS ARRHYTHMIA: Status: ACTIVE | Noted: 2021-02-14

## 2021-03-11 NOTE — PROGRESS NOTES
Date of Office Visit: 2021  Encounter Provider: Dr. Ra Jim  Place of Service: [facility] [place]  Patient Name: Matt Moore  :1990  Donte Gutierrez MD    Chief Complaint   Patient presents with   • Hypertension     6 week follow up/stress test   • Hyperlipidemia     History of Present Illness    I am pleased to see Mr. Moore in my office today as a follow-up    As you know, patient is 31-year-old white gentleman whose past medical history is significant for hypertension, tobacco abuse, alcohol abuse, who came today for follow-up.    In 2021, patient was presented to me for symptom of chest pain.  I recommended to proceed with stress echocardiography.  Patient walked for total of 9 minutes and echocardiogram showed no significant valvular heart disease LVEF was 60 to 65%.  Mild LVH was noted.  Stress echocardiography showed no wall motion abnormality significant for ischemia.    Patient brought the blood pressure logbook and most of the blood pressure at home are within desirable range.  His blood pressure is high in my office.  And he believes that this is due to whitecoat hypertension.  Patient has been able to quit drinking and smoking.  Patient denies any orthopnea or PND.    EKG showed sinus tachycardia with heart rate of 101.  Nonspecific intraventricular delay was noted.    His blood pressure is better controlled at home but he has elevated blood pressure with me.  However his stress test is negative.  At present I will continue current treatment.  Patient is advised to monitor the blood pressure and bring the log book.  If needed I would add Toprol-XL in future.        Past Medical History:   Diagnosis Date   • Agoraphobia    • Anxiety    • HDL deficiency    • Hyperlipidemia    • Hypertension    • Malaise and fatigue    • Near syncope    • Overweight    • Skin abnormality          History reviewed. No pertinent surgical history.        Current Outpatient  "Medications:   •  citalopram (CeleXA) 20 MG tablet, Take 1 tablet by mouth Daily., Disp: 90 tablet, Rfl: 3  •  lisinopril (PRINIVIL,ZESTRIL) 10 MG tablet, Take 1 tablet by mouth Daily., Disp: 90 tablet, Rfl: 3      Social History     Socioeconomic History   • Marital status: Single     Spouse name: Not on file   • Number of children: Not on file   • Years of education: Not on file   • Highest education level: Not on file   Tobacco Use   • Smoking status: Current Some Day Smoker     Types: Cigarettes   • Smokeless tobacco: Current User     Types: Chew   • Tobacco comment: smokes occasionally   Vaping Use   • Vaping Use: Never used   Substance and Sexual Activity   • Alcohol use: Yes   • Drug use: Never   • Sexual activity: Yes     Partners: Female     Birth control/protection: Condom         Review of Systems   Constitutional: Negative for chills and fever.   HENT: Negative for ear discharge and nosebleeds.    Eyes: Negative for discharge and redness.   Cardiovascular: Negative for chest pain, orthopnea, palpitations, paroxysmal nocturnal dyspnea and syncope.   Respiratory: Negative for cough, shortness of breath and wheezing.    Endocrine: Negative for heat intolerance.   Skin: Negative for rash.   Musculoskeletal: Negative for arthritis and myalgias.   Gastrointestinal: Negative for abdominal pain, melena, nausea and vomiting.   Genitourinary: Negative for dysuria and hematuria.   Neurological: Negative for dizziness, light-headedness, numbness and tremors.   Psychiatric/Behavioral: Negative for depression. The patient is not nervous/anxious.        Procedures    Procedures    No orders to display           Objective:    /78   Pulse 91   Ht 177.8 cm (70\")   Wt 88 kg (194 lb)   BMI 27.84 kg/m²         Constitutional:       Appearance: Well-developed.   Eyes:      General: No scleral icterus.        Right eye: No discharge.   HENT:      Head: Normocephalic and atraumatic.   Neck:      Thyroid: No " thyromegaly.      Lymphadenopathy: No cervical adenopathy.   Pulmonary:      Effort: Pulmonary effort is normal. No respiratory distress.      Breath sounds: Normal breath sounds. No wheezing. No rales.   Cardiovascular:      Normal rate. Regular rhythm.      No gallop.   Abdominal:      Tenderness: There is no abdominal tenderness.   Skin:     Findings: No erythema or rash.   Neurological:      Mental Status: Alert and oriented to person, place, and time.             Assessment:       Diagnosis Plan   1. Essential hypertension     2. Mixed hyperlipidemia     3. Sinus arrhythmia              Plan:       MDM:    1.  Chest pain:    Patient underwent stress test and it is negative for ischemia.  Continue to observe    2.  Sinus tachycardia    Patient is having sinus tachycardia but he has quit his drinking and smoking.  I would recommend observation and if he continues to have that high heart rates, I would consider Toprol-XL    3.  Hypertension:    Continue lisinopril at present.  His blood pressure is normal at home.

## 2021-03-12 ENCOUNTER — OFFICE VISIT (OUTPATIENT)
Dept: CARDIOLOGY | Facility: CLINIC | Age: 31
End: 2021-03-12

## 2021-03-12 VITALS
BODY MASS INDEX: 27.77 KG/M2 | HEIGHT: 70 IN | WEIGHT: 194 LBS | DIASTOLIC BLOOD PRESSURE: 78 MMHG | HEART RATE: 91 BPM | SYSTOLIC BLOOD PRESSURE: 150 MMHG

## 2021-03-12 DIAGNOSIS — E78.2 MIXED HYPERLIPIDEMIA: ICD-10-CM

## 2021-03-12 DIAGNOSIS — I10 ESSENTIAL HYPERTENSION: Primary | ICD-10-CM

## 2021-03-12 DIAGNOSIS — I49.8 SINUS ARRHYTHMIA: ICD-10-CM

## 2021-03-12 PROCEDURE — 99213 OFFICE O/P EST LOW 20 MIN: CPT | Performed by: INTERNAL MEDICINE

## 2021-04-15 DIAGNOSIS — I10 ESSENTIAL HYPERTENSION: ICD-10-CM

## 2021-04-15 NOTE — TELEPHONE ENCOUNTER
Caller: JUAN LAU    Relationship: Mother    Best call back number: [phone]    Medication needed:   Requested Prescriptions     Pending Prescriptions Disp Refills   • lisinopril (PRINIVIL,ZESTRIL) 10 MG tablet 90 tablet 3     Sig: Take 1 tablet by mouth Daily.       When do you need the refill by: ASAP    What additional details did the patient provide when requesting the medication: PATIENT HAS 2 DAYS LEFT OF MEDICATION.     Does the patient have less than a 3 day supply:  [x] Yes  [] No    What is the patient's preferred pharmacy: [id number] - [place], [place] - [address] - [phone] [facility] [phone] FX

## 2021-04-16 RX ORDER — LISINOPRIL 10 MG/1
10 TABLET ORAL DAILY
Qty: 30 TABLET | Refills: 0 | Status: SHIPPED | OUTPATIENT
Start: 2021-04-16 | End: 2021-04-19 | Stop reason: SDUPTHER

## 2021-04-19 DIAGNOSIS — I10 ESSENTIAL HYPERTENSION: ICD-10-CM

## 2021-04-19 RX ORDER — LISINOPRIL 10 MG/1
10 TABLET ORAL DAILY
Qty: 30 TABLET | Refills: 0 | Status: SHIPPED | OUTPATIENT
Start: 2021-04-19 | End: 2021-09-21 | Stop reason: SDUPTHER

## 2021-06-05 LAB
ALBUMIN SERPL-MCNC: 4.9 G/DL (ref 4.1–5.2)
ALBUMIN/GLOB SERPL: 1.8 {RATIO} (ref 1.2–2.2)
ALP SERPL-CCNC: 73 IU/L (ref 48–121)
ALT SERPL-CCNC: 23 IU/L (ref 0–44)
AST SERPL-CCNC: 23 IU/L (ref 0–40)
BILIRUB SERPL-MCNC: 0.7 MG/DL (ref 0–1.2)
BUN SERPL-MCNC: 10 MG/DL (ref 6–20)
BUN/CREAT SERPL: 11 (ref 9–20)
CALCIUM SERPL-MCNC: 10.3 MG/DL (ref 8.7–10.2)
CHLORIDE SERPL-SCNC: 102 MMOL/L (ref 96–106)
CHOLEST SERPL-MCNC: 198 MG/DL (ref 100–199)
CHOLEST/HDLC SERPL: 6.6 RATIO (ref 0–5)
CO2 SERPL-SCNC: 26 MMOL/L (ref 20–29)
CREAT SERPL-MCNC: 0.89 MG/DL (ref 0.76–1.27)
GLOBULIN SER CALC-MCNC: 2.8 G/DL (ref 1.5–4.5)
GLUCOSE SERPL-MCNC: 87 MG/DL (ref 65–99)
HDLC SERPL-MCNC: 30 MG/DL
LDLC SERPL CALC-MCNC: 140 MG/DL (ref 0–99)
POTASSIUM SERPL-SCNC: 4.8 MMOL/L (ref 3.5–5.2)
PROT SERPL-MCNC: 7.7 G/DL (ref 6–8.5)
SODIUM SERPL-SCNC: 142 MMOL/L (ref 134–144)
TRIGL SERPL-MCNC: 154 MG/DL (ref 0–149)
VLDLC SERPL CALC-MCNC: 28 MG/DL (ref 5–40)

## 2021-06-09 ENCOUNTER — OFFICE VISIT (OUTPATIENT)
Dept: FAMILY MEDICINE CLINIC | Facility: CLINIC | Age: 31
End: 2021-06-09

## 2021-06-09 VITALS
OXYGEN SATURATION: 99 % | WEIGHT: 201.4 LBS | TEMPERATURE: 98.1 F | RESPIRATION RATE: 15 BRPM | BODY MASS INDEX: 28.83 KG/M2 | SYSTOLIC BLOOD PRESSURE: 131 MMHG | DIASTOLIC BLOOD PRESSURE: 70 MMHG | HEIGHT: 70 IN | HEART RATE: 75 BPM

## 2021-06-09 DIAGNOSIS — E83.52 HYPERCALCEMIA: ICD-10-CM

## 2021-06-09 DIAGNOSIS — F41.9 ANXIETY: ICD-10-CM

## 2021-06-09 DIAGNOSIS — E78.2 MIXED HYPERLIPIDEMIA: ICD-10-CM

## 2021-06-09 DIAGNOSIS — I10 ESSENTIAL HYPERTENSION: Primary | ICD-10-CM

## 2021-06-09 PROCEDURE — 99214 OFFICE O/P EST MOD 30 MIN: CPT | Performed by: FAMILY MEDICINE

## 2021-06-09 NOTE — ASSESSMENT & PLAN NOTE
Doing well; Encouraged to watch salt, exercise more and lose weight.   Patient tolerated lisinopril well without side effects. I feel the benefits of the medication outweigh the risks.

## 2021-06-09 NOTE — ASSESSMENT & PLAN NOTE
Lipid and CMP reviewed with patient--worsening   Encouraged to watch fatty intake, exercise more, and lose weight.   No medication  Is not getting adequate diet and exercise  Goals developed at last visit were not met   Follow up in 3  Months- If not improving will start medication.  Care management needs are self-addressed. . Self-management abilities addressed and patient is capable of managing his own disease.

## 2021-06-09 NOTE — PROGRESS NOTES
Subjective   Matt Moore is a 31 y.o. male.     Hypertension  This is a chronic problem. The current episode started more than 1 year ago. The problem has been gradually improving since onset. The problem is controlled. Associated symptoms include anxiety. Pertinent negatives include no chest pain, palpitations or shortness of breath. Risk factors for coronary artery disease include dyslipidemia. Current antihypertension treatment includes beta blockers. The current treatment provides moderate improvement.   Hyperlipidemia  This is a chronic problem. The current episode started more than 1 year ago. The problem is controlled. Pertinent negatives include no chest pain, myalgias or shortness of breath. Current antihyperlipidemic treatment includes diet change and exercise.   Anxiety  Presents for follow-up visit. Patient reports no chest pain, nausea, nervous/anxious behavior, palpitations, panic or shortness of breath. Symptoms occur rarely. The severity of symptoms is mild. The quality of sleep is good.            The following portions of the patient's history were reviewed and updated as appropriate: current medications, past family history, past medical history, past social history, past surgical history and problem list.    Family History   Problem Relation Age of Onset   • Coronary artery disease Mother 52        and/or Hypertension; valve repair at 52 living at 55.   • Stroke Mother 52   • Hyperlipidemia Mother         lipid in future   • Hypertension Father    • Other Father         detatched retna bilateral   • Anxiety disorder Brother    • Hypertension Maternal Grandmother    • Diabetes Maternal Grandmother    • Hypertension Maternal Grandfather    • Colon cancer Paternal Grandmother    • Diabetes Paternal Grandmother    • Coronary artery disease Paternal Grandfather         and/or Hypertension   • Coronary artery disease Maternal Uncle         and/or Hypertension       Social History     Tobacco Use   •  "Smoking status: Former Smoker     Types: Cigarettes   • Smokeless tobacco: Current User     Types: Chew   • Tobacco comment: Patient has quit smoking   Vaping Use   • Vaping Use: Never used   Substance Use Topics   • Alcohol use: Yes   • Drug use: Never       No past surgical history on file.    Patient Active Problem List   Diagnosis   • Agoraphobia   • Anxiety   • Essential hypertension   • Skin abnormality   • Mixed hyperlipidemia   • Malaise and fatigue   • Arthritis   • Encounter for general adult medical examination with abnormal findings   • Sinus arrhythmia   • Intraventricular conduction delay   • Hypercalcemia       Current Outpatient Medications on File Prior to Visit   Medication Sig Dispense Refill   • citalopram (CeleXA) 20 MG tablet Take 1 tablet by mouth Daily. 90 tablet 3   • lisinopril (PRINIVIL,ZESTRIL) 10 MG tablet Take 1 tablet by mouth Daily. 30 tablet 0     No current facility-administered medications on file prior to visit.       No Known Allergies    Review of Systems   Constitutional: Negative for fatigue, unexpected weight gain and unexpected weight loss.   Respiratory: Negative for shortness of breath.    Cardiovascular: Negative for chest pain, palpitations and leg swelling.   Gastrointestinal: Negative for nausea.   Musculoskeletal: Negative for myalgias.   Skin: Negative for dry skin.   Neurological: Negative for headache.   Psychiatric/Behavioral: The patient is not nervous/anxious.        Objective   Visit Vitals  /70 (BP Location: Left arm, Patient Position: Sitting, Cuff Size: Adult)   Pulse 75   Temp 98.1 °F (36.7 °C)   Resp 15   Ht 177.8 cm (70\")   Wt 91.4 kg (201 lb 6.4 oz)   SpO2 99%   BMI 28.90 kg/m²     Physical Exam  Vitals and nursing note reviewed.   Constitutional:       Appearance: He is well-developed.   HENT:      Head: Normocephalic.   Neck:      Thyroid: No thyromegaly.      Vascular: No carotid bruit.      Trachea: Trachea normal.   Cardiovascular:      Rate " and Rhythm: Normal rate and regular rhythm.      Heart sounds: No murmur heard.   No friction rub. No gallop.    Pulmonary:      Effort: Pulmonary effort is normal. No respiratory distress.      Breath sounds: Normal breath sounds. No wheezing.   Chest:      Chest wall: No tenderness.   Musculoskeletal:      Cervical back: Neck supple.   Skin:     General: Skin is dry.      Findings: No rash.      Nails: There is no clubbing.   Neurological:      Mental Status: He is alert and oriented to person, place, and time.   Psychiatric:         Behavior: Behavior is cooperative.           Assessment/Plan .  Problem List Items Addressed This Visit        Medium    Anxiety    Current Assessment & Plan     Doing well with Celexa         Essential hypertension - Primary    Current Assessment & Plan     Doing well; Encouraged to watch salt, exercise more and lose weight.   Patient tolerated lisinopril well without side effects. I feel the benefits of the medication outweigh the risks.           Mixed hyperlipidemia    Current Assessment & Plan     Lipid and CMP reviewed with patient--worsening   Encouraged to watch fatty intake, exercise more, and lose weight.   No medication  Is not getting adequate diet and exercise  Goals developed at last visit were not met   Follow up in 3  Months- If not improving will start medication.  Care management needs are self-addressed. . Self-management abilities addressed and patient is capable of managing his own disease.           Relevant Orders    Lipid panel    Comprehensive Metabolic Panel       Unprioritized    Hypercalcemia    Current Assessment & Plan     Probable normal variant out of the Bell curve.  Patient is asymptomatic he is to avoid excess calcium intake and will repeat this with next labs.

## 2021-06-18 PROBLEM — E83.52 HYPERCALCEMIA: Status: ACTIVE | Noted: 2021-06-18

## 2021-06-18 NOTE — ASSESSMENT & PLAN NOTE
Probable normal variant out of the Bell curve.  Patient is asymptomatic he is to avoid excess calcium intake and will repeat this with next labs.

## 2021-08-26 PROBLEM — Z20.822 SUSPECTED COVID-19 VIRUS INFECTION: Status: ACTIVE | Noted: 2021-08-26

## 2021-08-26 PROBLEM — R03.0 ELEVATED BLOOD PRESSURE READING: Status: ACTIVE | Noted: 2021-08-26

## 2021-08-26 PROCEDURE — U0004 COV-19 TEST NON-CDC HGH THRU: HCPCS | Performed by: PHYSICIAN ASSISTANT

## 2021-08-30 ENCOUNTER — TELEPHONE (OUTPATIENT)
Dept: FAMILY MEDICINE CLINIC | Facility: CLINIC | Age: 31
End: 2021-08-30

## 2021-08-30 NOTE — TELEPHONE ENCOUNTER
Called to check on Matt. He had a (+) covid test.  I was unable to leave a message on cell phone- Voicemail has not been set up.

## 2021-09-18 LAB
ALBUMIN SERPL-MCNC: 4.9 G/DL (ref 4.1–5.2)
ALBUMIN/GLOB SERPL: 1.8 {RATIO} (ref 1.2–2.2)
ALP SERPL-CCNC: 83 IU/L (ref 44–121)
ALT SERPL-CCNC: 34 IU/L (ref 0–44)
AST SERPL-CCNC: 25 IU/L (ref 0–40)
BILIRUB SERPL-MCNC: 0.6 MG/DL (ref 0–1.2)
BUN SERPL-MCNC: 12 MG/DL (ref 6–20)
BUN/CREAT SERPL: 14 (ref 9–20)
CALCIUM SERPL-MCNC: 10 MG/DL (ref 8.7–10.2)
CHLORIDE SERPL-SCNC: 99 MMOL/L (ref 96–106)
CHOLEST SERPL-MCNC: 193 MG/DL (ref 100–199)
CO2 SERPL-SCNC: 25 MMOL/L (ref 20–29)
CREAT SERPL-MCNC: 0.88 MG/DL (ref 0.76–1.27)
GLOBULIN SER CALC-MCNC: 2.8 G/DL (ref 1.5–4.5)
GLUCOSE SERPL-MCNC: 92 MG/DL (ref 65–99)
HDLC SERPL-MCNC: 28 MG/DL
LDLC SERPL CALC-MCNC: 120 MG/DL (ref 0–99)
POTASSIUM SERPL-SCNC: 4.3 MMOL/L (ref 3.5–5.2)
PROT SERPL-MCNC: 7.7 G/DL (ref 6–8.5)
SODIUM SERPL-SCNC: 138 MMOL/L (ref 134–144)
TRIGL SERPL-MCNC: 257 MG/DL (ref 0–149)
VLDLC SERPL CALC-MCNC: 45 MG/DL (ref 5–40)

## 2021-09-21 ENCOUNTER — OFFICE VISIT (OUTPATIENT)
Dept: FAMILY MEDICINE CLINIC | Facility: CLINIC | Age: 31
End: 2021-09-21

## 2021-09-21 VITALS
HEART RATE: 73 BPM | DIASTOLIC BLOOD PRESSURE: 83 MMHG | WEIGHT: 198.2 LBS | OXYGEN SATURATION: 99 % | SYSTOLIC BLOOD PRESSURE: 121 MMHG | HEIGHT: 70 IN | TEMPERATURE: 97.9 F | RESPIRATION RATE: 15 BRPM | BODY MASS INDEX: 28.37 KG/M2

## 2021-09-21 DIAGNOSIS — E78.2 MIXED HYPERLIPIDEMIA: ICD-10-CM

## 2021-09-21 DIAGNOSIS — F32.89 OTHER DEPRESSION: ICD-10-CM

## 2021-09-21 DIAGNOSIS — Z82.49 FAMILY HISTORY OF HEART DISEASE: ICD-10-CM

## 2021-09-21 DIAGNOSIS — F41.9 ANXIETY: ICD-10-CM

## 2021-09-21 DIAGNOSIS — I10 ESSENTIAL HYPERTENSION: Primary | ICD-10-CM

## 2021-09-21 DIAGNOSIS — L98.9 SKIN ABNORMALITY: ICD-10-CM

## 2021-09-21 PROCEDURE — 99214 OFFICE O/P EST MOD 30 MIN: CPT | Performed by: FAMILY MEDICINE

## 2021-09-21 RX ORDER — ATORVASTATIN CALCIUM 10 MG/1
10 TABLET, FILM COATED ORAL DAILY
Qty: 90 TABLET | Refills: 3 | Status: SHIPPED | OUTPATIENT
Start: 2021-09-21 | End: 2022-08-31 | Stop reason: SDUPTHER

## 2021-09-21 RX ORDER — LISINOPRIL 10 MG/1
10 TABLET ORAL DAILY
Qty: 90 TABLET | Refills: 3 | Status: SHIPPED | OUTPATIENT
Start: 2021-09-21 | End: 2022-08-31 | Stop reason: SDUPTHER

## 2021-09-21 RX ORDER — CITALOPRAM 20 MG/1
20 TABLET ORAL DAILY
Qty: 90 TABLET | Refills: 3 | Status: SHIPPED | OUTPATIENT
Start: 2021-09-21 | End: 2022-08-31 | Stop reason: SDUPTHER

## 2021-09-21 NOTE — ASSESSMENT & PLAN NOTE
Lipid and CMP reviewed with patient--worsening; discussed starting medication due to family history of heart disease. Will start Lipitor 10 mg daily.   Encouraged to watch fatty intake, exercise more, and lose weight.   No medication.  Patient brought in diet diary which is reviewed and scanned in.  Advice is given but diet is not terrible is not getting adequate diet and exercise  Goals developed at last visit were not met   Follow up in 2  Months due to starting medication.  Care management needs are self-addressed.  Self-management abilities addressed and patient is capable of managing his own disease.

## 2021-09-21 NOTE — PROGRESS NOTES
Subjective   Matt Moore is a 31 y.o. male.     Hyperlipidemia  This is a chronic problem. The current episode started more than 1 year ago. The problem is controlled. Pertinent negatives include no chest pain, myalgias or shortness of breath. Current antihyperlipidemic treatment includes diet change and exercise. Risk factors for coronary artery disease include dyslipidemia and hypertension.   Hypertension  This is a chronic problem. The current episode started more than 1 year ago. The problem has been gradually improving since onset. The problem is controlled. Pertinent negatives include no chest pain, palpitations or shortness of breath. Risk factors for coronary artery disease include dyslipidemia. Current antihypertension treatment includes ACE inhibitors.        The following portions of the patient's history were reviewed and updated as appropriate: current medications, past family history, past medical history, past social history, past surgical history and problem list.    Family History   Problem Relation Age of Onset   • Coronary artery disease Mother 52        and/or Hypertension; valve repair at 52 living at 55.   • Stroke Mother 52   • Hyperlipidemia Mother         lipid in future   • Hypertension Father    • Other Father         detatched retna bilateral   • Anxiety disorder Brother    • Hypertension Maternal Grandmother    • Diabetes Maternal Grandmother    • Hypertension Maternal Grandfather    • Colon cancer Paternal Grandmother    • Diabetes Paternal Grandmother    • Coronary artery disease Paternal Grandfather         and/or Hypertension   • Coronary artery disease Maternal Uncle         and/or Hypertension       Social History     Tobacco Use   • Smoking status: Former Smoker     Types: Cigarettes   • Smokeless tobacco: Current User     Types: Chew   • Tobacco comment: Patient has quit smoking   Vaping Use   • Vaping Use: Never used   Substance Use Topics   • Alcohol use: Yes   • Drug use:  "Never       No past surgical history on file.    Patient Active Problem List   Diagnosis   • Agoraphobia   • Anxiety   • Essential hypertension   • Skin abnormality   • Mixed hyperlipidemia   • Malaise and fatigue   • Arthritis   • Encounter for general adult medical examination with abnormal findings   • Sinus arrhythmia   • Intraventricular conduction delay   • Hypercalcemia   • Suspected COVID-19 virus infection   • Elevated blood pressure reading   • Depression   • Family history of heart disease       No current outpatient medications on file prior to visit.     No current facility-administered medications on file prior to visit.       No Known Allergies    Review of Systems   Constitutional: Negative for fatigue, unexpected weight gain and unexpected weight loss.   Respiratory: Negative for shortness of breath.    Cardiovascular: Negative for chest pain, palpitations and leg swelling.   Gastrointestinal: Negative for nausea.   Musculoskeletal: Negative for myalgias.   Skin: Negative for dry skin.   Neurological: Negative for headache.       Objective   Visit Vitals  /83 (BP Location: Left arm, Patient Position: Sitting, Cuff Size: Large Adult)   Pulse 73   Temp 97.9 °F (36.6 °C)   Resp 15   Ht 177.8 cm (70\")   Wt 89.9 kg (198 lb 3.2 oz)   SpO2 99%   BMI 28.44 kg/m²     Physical Exam  Vitals and nursing note reviewed.   Constitutional:       Appearance: He is well-developed.   HENT:      Head: Normocephalic.   Neck:      Thyroid: No thyromegaly.      Vascular: No carotid bruit.      Trachea: Trachea normal.   Cardiovascular:      Rate and Rhythm: Normal rate and regular rhythm.      Heart sounds: No murmur heard.   No friction rub. No gallop.    Pulmonary:      Effort: Pulmonary effort is normal. No respiratory distress.      Breath sounds: Normal breath sounds. No wheezing.   Chest:      Chest wall: No tenderness.   Musculoskeletal:      Cervical back: Neck supple.   Skin:     General: Skin is dry.      " Findings: No rash.      Nails: There is no clubbing.          Neurological:      Mental Status: He is alert and oriented to person, place, and time.   Psychiatric:         Behavior: Behavior is cooperative.           Assessment/Plan .  Problem List Items Addressed This Visit        Medium    Anxiety    Current Assessment & Plan     Doing well with Celexa         Essential hypertension - Primary    Current Assessment & Plan     Slightly elevated- Encouraged to watch salt, exercise more and lose weight.  Patient tolerated lisinopril well without side effects. I feel the benefits of the medication outweigh the risks.           Relevant Medications    lisinopril (PRINIVIL,ZESTRIL) 10 MG tablet    Mixed hyperlipidemia    Current Assessment & Plan     Worsening; discussed starting medication due to family history of heart disease. Will start Lipitor 10 mg daily.   Encouraged to watch fatty intake, exercise more, and lose weight.   No medication  Is not getting adequate diet and exercise  Goals developed at last visit were not met   Follow up in 2  Months due to starting medication.  Care management needs are self-addressed.  Self-management abilities addressed and patient is capable of managing his own disease.           Relevant Medications    atorvastatin (Lipitor) 10 MG tablet       Unprioritized    Depression    Current Assessment & Plan     Doing well with Celexa         Relevant Medications    citalopram (CeleXA) 20 MG tablet    Family history of heart disease    Overview     Mother in her 50s and uncles in their 60s.  Patient strong encouraged to lower risk factors         Skin abnormality    Current Assessment & Plan     Anterior neck;  Will schedule an excision to rule out melanoma

## 2021-09-21 NOTE — ASSESSMENT & PLAN NOTE
Slightly elevated- Encouraged to watch salt, exercise more and lose weight.  Patient tolerated lisinopril well without side effects. I feel the benefits of the medication outweigh the risks.

## 2021-09-25 PROBLEM — Z82.49 FAMILY HISTORY OF HEART DISEASE: Status: ACTIVE | Noted: 2021-09-25

## 2021-09-25 PROBLEM — F32.A DEPRESSION: Status: ACTIVE | Noted: 2021-09-25

## 2021-11-09 ENCOUNTER — PROCEDURE VISIT (OUTPATIENT)
Dept: FAMILY MEDICINE CLINIC | Facility: CLINIC | Age: 31
End: 2021-11-09

## 2021-11-09 DIAGNOSIS — L98.9 SKIN ABNORMALITY: Primary | ICD-10-CM

## 2021-11-09 DIAGNOSIS — E78.2 MIXED HYPERLIPIDEMIA: ICD-10-CM

## 2021-11-09 PROCEDURE — 11421 EXC H-F-NK-SP B9+MARG 0.6-1: CPT | Performed by: FAMILY MEDICINE

## 2021-11-09 NOTE — ASSESSMENT & PLAN NOTE
Left side of neck;  Lesion measures 9 mm by 4 mm, defect is 1.9 cm x .8 cm.  Area cleansed with betadine, anesthestized with 1% epinephrine, excised with a 15 blade, superficial wound closed with 5.0 nylon. Sutures used x  3

## 2021-11-09 NOTE — PROGRESS NOTES
Subjective   Matt Moore is a 31 y.o. male.     Skin lesion of the left side of neck.  This is enlarging and changing colors       The following portions of the patient's history were reviewed and updated as appropriate: current medications, past family history, past medical history, past social history, past surgical history and problem list.    Family History   Problem Relation Age of Onset   • Coronary artery disease Mother 52        and/or Hypertension; valve repair at 52 living at 55.   • Stroke Mother 52   • Hyperlipidemia Mother         lipid in future   • Hypertension Father    • Other Father         detatched retna bilateral   • Anxiety disorder Brother    • Hypertension Maternal Grandmother    • Diabetes Maternal Grandmother    • Hypertension Maternal Grandfather    • Colon cancer Paternal Grandmother    • Diabetes Paternal Grandmother    • Coronary artery disease Paternal Grandfather         and/or Hypertension   • Coronary artery disease Maternal Uncle         and/or Hypertension       Social History     Tobacco Use   • Smoking status: Former Smoker     Types: Cigarettes   • Smokeless tobacco: Current User     Types: Chew   • Tobacco comment: Patient has quit smoking   Vaping Use   • Vaping Use: Never used   Substance Use Topics   • Alcohol use: Yes   • Drug use: Never       No past surgical history on file.    Patient Active Problem List   Diagnosis   • Agoraphobia   • Anxiety   • Essential hypertension   • Skin abnormality   • Mixed hyperlipidemia   • Malaise and fatigue   • Arthritis   • Encounter for general adult medical examination with abnormal findings   • Sinus arrhythmia   • Intraventricular conduction delay   • Hypercalcemia   • Suspected COVID-19 virus infection   • Elevated blood pressure reading   • Depression   • Family history of heart disease       Current Outpatient Medications on File Prior to Visit   Medication Sig Dispense Refill   • atorvastatin (Lipitor) 10 MG tablet Take 1 tablet  by mouth Daily. 90 tablet 3   • citalopram (CeleXA) 20 MG tablet Take 1 tablet by mouth Daily. 90 tablet 3   • lisinopril (PRINIVIL,ZESTRIL) 10 MG tablet Take 1 tablet by mouth Daily. 90 tablet 3     No current facility-administered medications on file prior to visit.       No Known Allergies    Review of Systems    Objective   There were no vitals taken for this visit.  Physical Exam  HENT:      Head:             Assessment/Plan .  Problem List Items Addressed This Visit        Medium    Mixed hyperlipidemia    Relevant Orders    Lipid Panel With / Chol / HDL Ratio (Completed)    Comprehensive Metabolic Panel (Completed)       Unprioritized    Skin abnormality - Primary    Current Assessment & Plan     Left side of neck;  Lesion measures 9 mm by 4 mm, defect is 1.9 cm x .8 cm.  Area cleansed with betadine, anesthestized with 1% epinephrine, excised with a 15 blade, superficial wound closed with 5.0 nylon. Sutures used x  3           Relevant Orders    Reference Histopathology (Completed)

## 2021-11-10 LAB
ALBUMIN SERPL-MCNC: 5 G/DL (ref 4.1–5.2)
ALBUMIN/GLOB SERPL: 1.9 {RATIO} (ref 1.2–2.2)
ALP SERPL-CCNC: 79 IU/L (ref 44–121)
ALT SERPL-CCNC: 29 IU/L (ref 0–44)
AST SERPL-CCNC: 25 IU/L (ref 0–40)
BILIRUB SERPL-MCNC: 0.4 MG/DL (ref 0–1.2)
BUN SERPL-MCNC: 13 MG/DL (ref 6–20)
BUN/CREAT SERPL: 15 (ref 9–20)
CALCIUM SERPL-MCNC: 10.2 MG/DL (ref 8.7–10.2)
CHLORIDE SERPL-SCNC: 101 MMOL/L (ref 96–106)
CHOLEST SERPL-MCNC: 110 MG/DL (ref 100–199)
CHOLEST/HDLC SERPL: 3.3 RATIO (ref 0–5)
CO2 SERPL-SCNC: 26 MMOL/L (ref 20–29)
CREAT SERPL-MCNC: 0.88 MG/DL (ref 0.76–1.27)
GLOBULIN SER CALC-MCNC: 2.7 G/DL (ref 1.5–4.5)
GLUCOSE SERPL-MCNC: 93 MG/DL (ref 65–99)
HDLC SERPL-MCNC: 33 MG/DL
LDLC SERPL CALC-MCNC: 56 MG/DL (ref 0–99)
POTASSIUM SERPL-SCNC: 4.8 MMOL/L (ref 3.5–5.2)
PROT SERPL-MCNC: 7.7 G/DL (ref 6–8.5)
SODIUM SERPL-SCNC: 142 MMOL/L (ref 134–144)
TRIGL SERPL-MCNC: 112 MG/DL (ref 0–149)
VLDLC SERPL CALC-MCNC: 21 MG/DL (ref 5–40)

## 2021-11-12 LAB
DX ICD CODE: NORMAL
DX ICD CODE: NORMAL
PATH REPORT.FINAL DX SPEC: NORMAL
PATH REPORT.GROSS SPEC: NORMAL
PATH REPORT.SITE OF ORIGIN SPEC: NORMAL
PATHOLOGIST NAME: NORMAL
PAYMENT PROCEDURE: NORMAL

## 2021-11-17 ENCOUNTER — OFFICE VISIT (OUTPATIENT)
Dept: FAMILY MEDICINE CLINIC | Facility: CLINIC | Age: 31
End: 2021-11-17

## 2021-11-17 VITALS
SYSTOLIC BLOOD PRESSURE: 120 MMHG | WEIGHT: 201 LBS | HEART RATE: 83 BPM | BODY MASS INDEX: 28.77 KG/M2 | OXYGEN SATURATION: 99 % | HEIGHT: 70 IN | RESPIRATION RATE: 15 BRPM | DIASTOLIC BLOOD PRESSURE: 73 MMHG | TEMPERATURE: 97.7 F

## 2021-11-17 DIAGNOSIS — L98.9 SKIN ABNORMALITY: ICD-10-CM

## 2021-11-17 DIAGNOSIS — I10 ESSENTIAL HYPERTENSION: Primary | ICD-10-CM

## 2021-11-17 DIAGNOSIS — E78.2 MIXED HYPERLIPIDEMIA: ICD-10-CM

## 2021-11-17 PROCEDURE — 99214 OFFICE O/P EST MOD 30 MIN: CPT | Performed by: FAMILY MEDICINE

## 2021-12-16 NOTE — PROGRESS NOTES
Date of Office Visit: 2021  Encounter Provider: Dr. Ra Jim  Place of Service: [facility] [place]  Patient Name: Matt Moore  :1990  Donte Gutierrez MD    Chief Complaint   Patient presents with   • Hypertension     9 month follow up   • Hyperlipidemia     History of Present Illness    I am pleased to see Mr. Moore in my office today as a follow-up    As you know, patient is 31-year-old white gentleman whose past medical history is significant for hypertension, tobacco abuse, alcohol abuse, who came today for follow-up.    In 2021, patient was presented to me for symptom of chest pain.  I recommended to proceed with stress echocardiography.  Patient walked for total of 9 minutes and echocardiogram showed no significant valvular heart disease LVEF was 60 to 65%.  Mild LVH was noted.  Stress echocardiography showed no wall motion abnormality significant for ischemia.    Patient came today for 9-month follow-up.  He brought his blood pressure logbook and most of the blood pressures are within desirable range.  Patient denies any chest pain or tightness or heaviness.  No orthopnea, PND, syncope or presyncope.  No leg edema noted.    I am very pleased with the patient condition.  His blood pressure is controlled.  I would continue current therapy with lisinopril.  I will see the patient as needed.  Patient is advised to follow-up with Dr. Gutierrez.        Past Medical History:   Diagnosis Date   • Agoraphobia    • Anxiety    • HDL deficiency    • Hyperlipidemia    • Hypertension    • Malaise and fatigue    • Near syncope    • Overweight    • Skin abnormality          History reviewed. No pertinent surgical history.        Current Outpatient Medications:   •  atorvastatin (Lipitor) 10 MG tablet, Take 1 tablet by mouth Daily., Disp: 90 tablet, Rfl: 3  •  citalopram (CeleXA) 20 MG tablet, Take 1 tablet by mouth Daily., Disp: 90 tablet, Rfl: 3  •  lisinopril (PRINIVIL,ZESTRIL) 10 MG  "tablet, Take 1 tablet by mouth Daily., Disp: 90 tablet, Rfl: 3      Social History     Socioeconomic History   • Marital status: Single   Tobacco Use   • Smoking status: Former Smoker     Types: Cigarettes   • Smokeless tobacco: Current User     Types: Chew   • Tobacco comment: Patient has quit smoking   Vaping Use   • Vaping Use: Never used   Substance and Sexual Activity   • Alcohol use: Yes   • Drug use: Never   • Sexual activity: Yes     Partners: Female     Birth control/protection: Condom         Review of Systems   Constitutional: Negative for chills and fever.   HENT: Negative for ear discharge and nosebleeds.    Eyes: Negative for discharge and redness.   Cardiovascular: Negative for chest pain, orthopnea, palpitations, paroxysmal nocturnal dyspnea and syncope.   Respiratory: Negative for cough, shortness of breath and wheezing.    Endocrine: Negative for heat intolerance.   Skin: Negative for rash.   Musculoskeletal: Negative for arthritis and myalgias.   Gastrointestinal: Negative for abdominal pain, melena, nausea and vomiting.   Genitourinary: Negative for dysuria and hematuria.   Neurological: Negative for dizziness, light-headedness, numbness and tremors.   Psychiatric/Behavioral: Negative for depression. The patient is not nervous/anxious.        Procedures      ECG 12 Lead    Date/Time: 12/17/2021 3:44 PM  Performed by: Ra Jim MD  Authorized by: Ra Jim MD   Comparison: compared with previous ECG   Similar to previous ECG  Rhythm: sinus rhythm    Clinical impression: normal ECG            ECG 12 Lead    (Results Pending)           Objective:    /80   Pulse 72   Ht 177.8 cm (70\")   Wt 91.2 kg (201 lb)   BMI 28.84 kg/m²         Constitutional:       Appearance: Well-developed.   Eyes:      General: No scleral icterus.        Right eye: No discharge.   HENT:      Head: Normocephalic and atraumatic.   Neck:      Thyroid: No thyromegaly.      Lymphadenopathy: No cervical " adenopathy.   Pulmonary:      Effort: Pulmonary effort is normal. No respiratory distress.      Breath sounds: Normal breath sounds. No wheezing. No rales.   Cardiovascular:      Normal rate. Regular rhythm.      No gallop.   Abdominal:      Tenderness: There is no abdominal tenderness.   Skin:     Findings: No erythema or rash.   Neurological:      Mental Status: Alert and oriented to person, place, and time.             Assessment:       Diagnosis Plan   1. Essential hypertension  ECG 12 Lead   2. Mixed hyperlipidemia  ECG 12 Lead   3. Sinus arrhythmia  ECG 12 Lead            Plan:       MDM:    1.  Hypertension:    Blood pressure is very well controlled current treatment would be continued.  I will see the patient as needed    2.  Hyperlipidemia:    Patient is on Lipitor recent LDL is 56.  Recommend repeat LDL in future.

## 2021-12-17 ENCOUNTER — OFFICE VISIT (OUTPATIENT)
Dept: CARDIOLOGY | Facility: CLINIC | Age: 31
End: 2021-12-17

## 2021-12-17 VITALS
SYSTOLIC BLOOD PRESSURE: 133 MMHG | HEART RATE: 72 BPM | HEIGHT: 70 IN | DIASTOLIC BLOOD PRESSURE: 80 MMHG | BODY MASS INDEX: 28.77 KG/M2 | WEIGHT: 201 LBS

## 2021-12-17 DIAGNOSIS — E78.2 MIXED HYPERLIPIDEMIA: ICD-10-CM

## 2021-12-17 DIAGNOSIS — I49.8 SINUS ARRHYTHMIA: ICD-10-CM

## 2021-12-17 DIAGNOSIS — I10 ESSENTIAL HYPERTENSION: Primary | ICD-10-CM

## 2021-12-17 PROCEDURE — 93000 ELECTROCARDIOGRAM COMPLETE: CPT | Performed by: INTERNAL MEDICINE

## 2021-12-17 PROCEDURE — 99213 OFFICE O/P EST LOW 20 MIN: CPT | Performed by: INTERNAL MEDICINE

## 2021-12-28 ENCOUNTER — PROCEDURE VISIT (OUTPATIENT)
Dept: FAMILY MEDICINE CLINIC | Facility: CLINIC | Age: 31
End: 2021-12-28

## 2021-12-28 VITALS
WEIGHT: 203.6 LBS | SYSTOLIC BLOOD PRESSURE: 122 MMHG | OXYGEN SATURATION: 99 % | RESPIRATION RATE: 18 BRPM | TEMPERATURE: 98.2 F | DIASTOLIC BLOOD PRESSURE: 82 MMHG | BODY MASS INDEX: 29.15 KG/M2 | HEIGHT: 70 IN | HEART RATE: 76 BPM

## 2021-12-28 DIAGNOSIS — D48.9 NEOPLASM, UNCERTAIN WHETHER BENIGN OR MALIGNANT: Primary | ICD-10-CM

## 2021-12-28 PROCEDURE — 11422 EXC H-F-NK-SP B9+MARG 1.1-2: CPT | Performed by: FAMILY MEDICINE

## 2022-01-06 ENCOUNTER — OFFICE VISIT (OUTPATIENT)
Dept: FAMILY MEDICINE CLINIC | Facility: CLINIC | Age: 32
End: 2022-01-06

## 2022-01-06 DIAGNOSIS — D48.9 NEOPLASM, UNCERTAIN WHETHER BENIGN OR MALIGNANT: Primary | ICD-10-CM

## 2022-01-06 DIAGNOSIS — L98.9 SKIN ABNORMALITY: ICD-10-CM

## 2022-01-06 PROCEDURE — 11421 EXC H-F-NK-SP B9+MARG 0.6-1: CPT | Performed by: FAMILY MEDICINE

## 2022-01-06 NOTE — ASSESSMENT & PLAN NOTE
Throat-actually 2 lesions very close together but the measurements include both lesions-- lesion measures 6 mm by 4 mm, defect is .9 cm x 1.4 cm.  Area cleansed with betadine, anesthestized with 1% epinephrine, excised with a 15 blade, superficial wound closed with 5.0 nylon. Sutures used x  1 mattress and 3 interupted

## 2022-01-06 NOTE — PROGRESS NOTES
Subjective   Matt Moore is a 31 y.o. male.     Skin lesion of the throat has become larger and changing colors.  He also has recent history of an atypical nevus excised and this looks the same or worse    Suture / Staple Removal  The sutures were placed 7 to 10 days ago. He tried nothing since the wound repair. His temperature was unmeasured prior to arrival. There has been no drainage from the wound. There is no redness present. There is no swelling present. There is no pain present.        The following portions of the patient's history were reviewed and updated as appropriate: current medications, past family history, past medical history, past social history, past surgical history and problem list.    Family History   Problem Relation Age of Onset   • Coronary artery disease Mother 52        and/or Hypertension; valve repair at 52 living at 55.   • Stroke Mother 52   • Hyperlipidemia Mother         lipid in future   • Hypertension Father    • Other Father         detatched retna bilateral   • Anxiety disorder Brother    • Hypertension Maternal Grandmother    • Diabetes Maternal Grandmother    • Hypertension Maternal Grandfather    • Colon cancer Paternal Grandmother    • Diabetes Paternal Grandmother    • Coronary artery disease Paternal Grandfather         and/or Hypertension   • Coronary artery disease Maternal Uncle         and/or Hypertension       Social History     Tobacco Use   • Smoking status: Former Smoker     Types: Cigarettes   • Smokeless tobacco: Current User     Types: Chew   • Tobacco comment: Patient has quit smoking   Vaping Use   • Vaping Use: Never used   Substance Use Topics   • Alcohol use: Yes   • Drug use: Never       No past surgical history on file.    Patient Active Problem List   Diagnosis   • Agoraphobia   • Anxiety   • Essential hypertension   • Skin abnormality   • Mixed hyperlipidemia   • Malaise and fatigue   • Arthritis   • Encounter for general adult medical examination with  abnormal findings   • Sinus arrhythmia   • Intraventricular conduction delay   • Hypercalcemia   • Suspected COVID-19 virus infection   • Elevated blood pressure reading   • Depression   • Family history of heart disease   • Neoplasm, uncertain whether benign or malignant       Current Outpatient Medications on File Prior to Visit   Medication Sig Dispense Refill   • atorvastatin (Lipitor) 10 MG tablet Take 1 tablet by mouth Daily. 90 tablet 3   • citalopram (CeleXA) 20 MG tablet Take 1 tablet by mouth Daily. 90 tablet 3   • lisinopril (PRINIVIL,ZESTRIL) 10 MG tablet Take 1 tablet by mouth Daily. 90 tablet 3     No current facility-administered medications on file prior to visit.       No Known Allergies    Review of Systems    Objective   There were no vitals taken for this visit.  Physical Exam  HENT:      Head:             Assessment/Plan .  Problem List Items Addressed This Visit        Unprioritized    Neoplasm, uncertain whether benign or malignant - Primary    Current Assessment & Plan     Throat-actually 2 lesions very close together but the measurements include both lesions-- lesion measures 6 mm by 4 mm, defect is .9 cm x 1.4 cm.  Area cleansed with betadine, anesthestized with 1% epinephrine, excised with a 15 blade, superficial wound closed with 5.0 nylon. Sutures used x  1 mattress and 3 interupted           Relevant Orders    Reference Histopathology (Completed)    Skin abnormality    Current Assessment & Plan     Healing well- discussed the pathology and sutures removed.

## 2022-01-13 ENCOUNTER — OFFICE VISIT (OUTPATIENT)
Dept: FAMILY MEDICINE CLINIC | Facility: CLINIC | Age: 32
End: 2022-01-13

## 2022-01-13 VITALS
HEART RATE: 91 BPM | BODY MASS INDEX: 28.75 KG/M2 | RESPIRATION RATE: 18 BRPM | SYSTOLIC BLOOD PRESSURE: 138 MMHG | HEIGHT: 70 IN | DIASTOLIC BLOOD PRESSURE: 78 MMHG | WEIGHT: 200.8 LBS | TEMPERATURE: 97.8 F | OXYGEN SATURATION: 96 %

## 2022-01-13 DIAGNOSIS — Z48.02 VISIT FOR SUTURE REMOVAL: Primary | ICD-10-CM

## 2022-01-13 DIAGNOSIS — D48.9 NEOPLASM, UNCERTAIN WHETHER BENIGN OR MALIGNANT: ICD-10-CM

## 2022-01-13 PROCEDURE — 99024 POSTOP FOLLOW-UP VISIT: CPT | Performed by: FAMILY MEDICINE

## 2022-01-13 NOTE — PROGRESS NOTES
Subjective   Matt Moore is a 31 y.o. male.   Chief Complaint   Patient presents with   • Wound Check     Wound Check  He was originally treated 5 to 10 days ago (1-6-2022). His temperature was unmeasured prior to arrival. There has been no drainage from the wound. There is no redness present. There is no swelling present. There is no pain present. He has no difficulty moving the affected extremity or digit.        The following portions of the patient's history were reviewed and updated as appropriate: allergies, current medications, past family history, past medical history, past social history, past surgical history and problem list.    Past Medical History:   Diagnosis Date   • Agoraphobia    • Anxiety    • HDL deficiency    • Hyperlipidemia    • Hypertension    • Malaise and fatigue    • Near syncope    • Overweight    • Skin abnormality        History reviewed. No pertinent surgical history.     Family History   Problem Relation Age of Onset   • Coronary artery disease Mother 52        and/or Hypertension; valve repair at 52 living at 55.   • Stroke Mother 52   • Hyperlipidemia Mother         lipid in future   • Hypertension Father    • Other Father         detatched retna bilateral   • Anxiety disorder Brother    • Hypertension Maternal Grandmother    • Diabetes Maternal Grandmother    • Hypertension Maternal Grandfather    • Colon cancer Paternal Grandmother    • Diabetes Paternal Grandmother    • Coronary artery disease Paternal Grandfather         and/or Hypertension   • Coronary artery disease Maternal Uncle         and/or Hypertension        Social History     Socioeconomic History   • Marital status: Single   Tobacco Use   • Smoking status: Former Smoker     Types: Cigarettes   • Smokeless tobacco: Current User     Types: Chew   • Tobacco comment: Patient has quit smoking   Vaping Use   • Vaping Use: Never used   Substance and Sexual Activity   • Alcohol use: Yes   • Drug use: Never   • Sexual  "activity: Yes     Partners: Female     Birth control/protection: Condom         Review of Systems   Constitutional: Negative for chills, diaphoresis, fatigue and fever.   Skin: Positive for skin lesions.       Objective   Visit Vitals  /78 (BP Location: Right arm, Patient Position: Sitting, Cuff Size: Adult)   Pulse 91   Temp 97.8 °F (36.6 °C) (Temporal)   Resp 18   Ht 177.8 cm (70\")   Wt 91.1 kg (200 lb 12.8 oz)   SpO2 96%   BMI 28.81 kg/m²     Physical Exam  Skin:               Assessment/Plan   Problem List Items Addressed This Visit        Unprioritized    Neoplasm, uncertain whether benign or malignant    Current Assessment & Plan     Resolved with excision         Visit for suture removal - Primary    Current Assessment & Plan     Sutures removed today, reviewed pathology with patient.  Wound healing well.                        "

## 2022-01-24 ENCOUNTER — TELEPHONE (OUTPATIENT)
Dept: FAMILY MEDICINE CLINIC | Facility: CLINIC | Age: 32
End: 2022-01-24

## 2022-02-24 DIAGNOSIS — E78.2 MIXED HYPERLIPIDEMIA: Primary | ICD-10-CM

## 2022-02-26 LAB
ALBUMIN SERPL-MCNC: 5.3 G/DL (ref 4.1–5.2)
ALBUMIN/GLOB SERPL: 2.1 {RATIO} (ref 1.2–2.2)
ALP SERPL-CCNC: 78 IU/L (ref 44–121)
ALT SERPL-CCNC: 35 IU/L (ref 0–44)
AST SERPL-CCNC: 27 IU/L (ref 0–40)
BILIRUB SERPL-MCNC: 0.6 MG/DL (ref 0–1.2)
BUN SERPL-MCNC: 13 MG/DL (ref 6–20)
BUN/CREAT SERPL: 14 (ref 9–20)
CALCIUM SERPL-MCNC: 10 MG/DL (ref 8.7–10.2)
CHLORIDE SERPL-SCNC: 96 MMOL/L (ref 96–106)
CHOLEST SERPL-MCNC: 133 MG/DL (ref 100–199)
CHOLEST/HDLC SERPL: 3.7 RATIO (ref 0–5)
CO2 SERPL-SCNC: 26 MMOL/L (ref 20–29)
CREAT SERPL-MCNC: 0.93 MG/DL (ref 0.76–1.27)
GLOBULIN SER CALC-MCNC: 2.5 G/DL (ref 1.5–4.5)
GLUCOSE SERPL-MCNC: 94 MG/DL (ref 65–99)
HDLC SERPL-MCNC: 36 MG/DL
LDLC SERPL CALC-MCNC: 74 MG/DL (ref 0–99)
POTASSIUM SERPL-SCNC: 4.5 MMOL/L (ref 3.5–5.2)
PROT SERPL-MCNC: 7.8 G/DL (ref 6–8.5)
SODIUM SERPL-SCNC: 136 MMOL/L (ref 134–144)
TRIGL SERPL-MCNC: 130 MG/DL (ref 0–149)
VLDLC SERPL CALC-MCNC: 23 MG/DL (ref 5–40)

## 2022-03-02 ENCOUNTER — OFFICE VISIT (OUTPATIENT)
Dept: FAMILY MEDICINE CLINIC | Facility: CLINIC | Age: 32
End: 2022-03-02

## 2022-03-02 VITALS
SYSTOLIC BLOOD PRESSURE: 132 MMHG | DIASTOLIC BLOOD PRESSURE: 76 MMHG | HEART RATE: 89 BPM | OXYGEN SATURATION: 98 % | BODY MASS INDEX: 29.2 KG/M2 | RESPIRATION RATE: 16 BRPM | WEIGHT: 204 LBS | HEIGHT: 70 IN | TEMPERATURE: 97.9 F

## 2022-03-02 DIAGNOSIS — F41.9 ANXIETY: ICD-10-CM

## 2022-03-02 DIAGNOSIS — E78.2 MIXED HYPERLIPIDEMIA: ICD-10-CM

## 2022-03-02 DIAGNOSIS — I10 ESSENTIAL HYPERTENSION: Primary | ICD-10-CM

## 2022-03-02 PROCEDURE — 99214 OFFICE O/P EST MOD 30 MIN: CPT | Performed by: FAMILY MEDICINE

## 2022-03-02 NOTE — ASSESSMENT & PLAN NOTE
Slightly worse due to a good friend with cancer. Patient tolerated Celexa well without side effects.  He declines increasing dose.  I feel the benefits of the medication outweigh the risks.

## 2022-03-02 NOTE — PROGRESS NOTES
Subjective   Matt Moore is a 32 y.o. male.     Hyperlipidemia  This is a chronic problem. The current episode started more than 1 year ago. The problem is controlled. Pertinent negatives include no chest pain, myalgias or shortness of breath. Current antihyperlipidemic treatment includes statins. The current treatment provides moderate improvement of lipids. Risk factors for coronary artery disease include dyslipidemia and hypertension.   Hypertension  This is a chronic problem. The current episode started more than 1 year ago. The problem has been gradually improving since onset. The problem is controlled. Associated symptoms include anxiety. Pertinent negatives include no chest pain, palpitations or shortness of breath. Risk factors for coronary artery disease include dyslipidemia. Current antihypertension treatment includes beta blockers. The current treatment provides moderate improvement.   Anxiety  Presents for follow-up visit. Symptoms include nervous/anxious behavior. Patient reports no chest pain, nausea, palpitations or shortness of breath. Symptoms occur occasionally. The quality of sleep is good.            The following portions of the patient's history were reviewed and updated as appropriate: current medications, past family history, past medical history, past social history, past surgical history and problem list.    Family History   Problem Relation Age of Onset   • Coronary artery disease Mother 52        and/or Hypertension; valve repair at 52 living at 55.   • Stroke Mother 52   • Hyperlipidemia Mother         lipid in future   • Hypertension Father    • Other Father         detatched retna bilateral   • Anxiety disorder Brother    • Hypertension Maternal Grandmother    • Diabetes Maternal Grandmother    • Hypertension Maternal Grandfather    • Colon cancer Paternal Grandmother    • Diabetes Paternal Grandmother    • Coronary artery disease Paternal Grandfather         and/or Hypertension   •  Coronary artery disease Maternal Uncle         and/or Hypertension       Social History     Tobacco Use   • Smoking status: Former Smoker     Types: Cigarettes   • Smokeless tobacco: Current User     Types: Chew   • Tobacco comment: Patient has quit smoking   Vaping Use   • Vaping Use: Never used   Substance Use Topics   • Alcohol use: Yes   • Drug use: Never       No past surgical history on file.    Patient Active Problem List   Diagnosis   • Agoraphobia   • Anxiety   • Essential hypertension   • Skin abnormality   • Mixed hyperlipidemia   • Malaise and fatigue   • Arthritis   • Encounter for general adult medical examination with abnormal findings   • Sinus arrhythmia   • Intraventricular conduction delay   • Hypercalcemia   • Suspected COVID-19 virus infection   • Elevated blood pressure reading   • Depression   • Family history of heart disease   • Neoplasm, uncertain whether benign or malignant   • Visit for suture removal       Current Outpatient Medications on File Prior to Visit   Medication Sig Dispense Refill   • atorvastatin (Lipitor) 10 MG tablet Take 1 tablet by mouth Daily. 90 tablet 3   • citalopram (CeleXA) 20 MG tablet Take 1 tablet by mouth Daily. 90 tablet 3   • lisinopril (PRINIVIL,ZESTRIL) 10 MG tablet Take 1 tablet by mouth Daily. 90 tablet 3     No current facility-administered medications on file prior to visit.       No Known Allergies    Review of Systems   Constitutional: Negative for fatigue, unexpected weight gain and unexpected weight loss.   Respiratory: Negative for shortness of breath.    Cardiovascular: Negative for chest pain, palpitations and leg swelling.   Gastrointestinal: Negative for nausea.   Musculoskeletal: Negative for myalgias.   Skin: Negative for dry skin.   Neurological: Negative for headache.   Psychiatric/Behavioral: The patient is nervous/anxious.        Objective   Visit Vitals  /76 (BP Location: Left arm, Patient Position: Sitting, Cuff Size: Large Adult)  "  Pulse 89   Temp 97.9 °F (36.6 °C)   Resp 16   Ht 177.8 cm (70\")   Wt 92.5 kg (204 lb)   SpO2 98%   BMI 29.27 kg/m²     Physical Exam  Vitals and nursing note reviewed.   Constitutional:       Appearance: He is well-developed.   HENT:      Head: Normocephalic.   Neck:      Thyroid: No thyromegaly.      Vascular: No carotid bruit.      Trachea: Trachea normal.   Cardiovascular:      Rate and Rhythm: Normal rate and regular rhythm.      Heart sounds: No murmur heard.    No friction rub. No gallop.   Pulmonary:      Effort: Pulmonary effort is normal. No respiratory distress.      Breath sounds: Normal breath sounds. No wheezing.   Chest:      Chest wall: No tenderness.   Musculoskeletal:      Cervical back: Neck supple.   Skin:     General: Skin is dry.      Findings: No rash.      Nails: There is no clubbing.   Neurological:      Mental Status: He is alert and oriented to person, place, and time.   Psychiatric:         Behavior: Behavior is cooperative.           Assessment/Plan .  Problem List Items Addressed This Visit        Medium    Anxiety    Current Assessment & Plan     Slightly worse due to a good friend with cancer. Patient tolerated Celexa well without side effects.  He declines increasing dose.  I feel the benefits of the medication outweigh the risks.             Essential hypertension - Primary    Current Assessment & Plan     Borderline good control; Encouraged to watch salt, exercise more and lose weight.  Patient tolerated lisinopril well without side effects. I feel the benefits of the medication outweigh the risks.             Mixed hyperlipidemia    Current Assessment & Plan     Lipid and CMP reviewed with patient  Stable Chol 133 up from 110, Trig 130 up from 112, HDL 36 up from 33, LDL74 up from 56  Encouraged to watch fatty intake, exercise more, and lose weight.   compliant with medication Patient tolerated Lipitor well without side effects. I feel the benefits of the medication outweigh the " risks.    Is not getting adequate diet and exercise  Goals developed at last visit were not met due to low HDL  Follow up in 6  months  Care management needs are self-addressed.Self-management abilities addressed and patient is capable of managing his own disease.

## 2022-03-02 NOTE — ASSESSMENT & PLAN NOTE
Borderline good control; Encouraged to watch salt, exercise more and lose weight.  Patient tolerated lisinopril well without side effects. I feel the benefits of the medication outweigh the risks.

## 2022-03-02 NOTE — ASSESSMENT & PLAN NOTE
Lipid and CMP reviewed with patient  Stable Chol 133 up from 110, Trig 130 up from 112, HDL 36 up from 33, LDL74 up from 56  Encouraged to watch fatty intake, exercise more, and lose weight.   compliant with medication Patient tolerated Lipitor well without side effects. I feel the benefits of the medication outweigh the risks.    Is not getting adequate diet and exercise  Goals developed at last visit were not met due to low HDL  Follow up in 6  months  Care management needs are self-addressed.Self-management abilities addressed and patient is capable of managing his own disease.

## 2022-05-10 PROBLEM — E66.3 OVERWEIGHT: Status: ACTIVE | Noted: 2022-05-10

## 2022-08-22 ENCOUNTER — OFFICE VISIT (OUTPATIENT)
Dept: FAMILY MEDICINE CLINIC | Facility: CLINIC | Age: 32
End: 2022-08-22

## 2022-08-22 VITALS
HEIGHT: 70 IN | DIASTOLIC BLOOD PRESSURE: 80 MMHG | TEMPERATURE: 97.5 F | WEIGHT: 209 LBS | OXYGEN SATURATION: 97 % | HEART RATE: 100 BPM | SYSTOLIC BLOOD PRESSURE: 118 MMHG | BODY MASS INDEX: 29.92 KG/M2 | RESPIRATION RATE: 18 BRPM

## 2022-08-22 DIAGNOSIS — M25.511 CHRONIC RIGHT SHOULDER PAIN: Primary | ICD-10-CM

## 2022-08-22 DIAGNOSIS — M54.50 CHRONIC BILATERAL LOW BACK PAIN WITHOUT SCIATICA: ICD-10-CM

## 2022-08-22 DIAGNOSIS — F32.89 OTHER DEPRESSION: ICD-10-CM

## 2022-08-22 DIAGNOSIS — D48.9 NEOPLASM, UNCERTAIN WHETHER BENIGN OR MALIGNANT: ICD-10-CM

## 2022-08-22 DIAGNOSIS — I10 ESSENTIAL HYPERTENSION: ICD-10-CM

## 2022-08-22 DIAGNOSIS — F40.00 AGORAPHOBIA: ICD-10-CM

## 2022-08-22 DIAGNOSIS — G89.29 CHRONIC BILATERAL LOW BACK PAIN WITHOUT SCIATICA: ICD-10-CM

## 2022-08-22 DIAGNOSIS — M79.10 MYALGIA: ICD-10-CM

## 2022-08-22 DIAGNOSIS — G89.29 CHRONIC RIGHT SHOULDER PAIN: Primary | ICD-10-CM

## 2022-08-22 DIAGNOSIS — E78.2 MIXED HYPERLIPIDEMIA: ICD-10-CM

## 2022-08-22 PROCEDURE — 99214 OFFICE O/P EST MOD 30 MIN: CPT | Performed by: FAMILY MEDICINE

## 2022-08-22 RX ORDER — MELOXICAM 15 MG/1
15 TABLET ORAL DAILY
Qty: 30 TABLET | Refills: 12 | Status: SHIPPED | OUTPATIENT
Start: 2022-08-22

## 2022-08-22 NOTE — ASSESSMENT & PLAN NOTE
New dx.  Start Mobic 15 mg daily.  Benefits and risk of medication discussed I feel benefits outweigh the risk--offered Xray, pt. Declines.

## 2022-08-22 NOTE — PROGRESS NOTES
Subjective   Matt Moore is a 32 y.o. male.   Chief Complaint   Patient presents with   • Annual Exam   • Shoulder Pain   • Back Pain   • Hypertension   • Anxiety   • Skin Problem     Right shoulder pain comes and goes is been there for some time.  He denies any injury.    Back Pain  This is a chronic problem. The current episode started more than 1 year ago. The problem occurs intermittently. The problem is unchanged. The pain is present in the lumbar spine. The pain does not radiate. The pain is mild. The symptoms are aggravated by position (driving). Pertinent negatives include no chest pain. The treatment provided no relief.   Hypertension  This is a chronic problem. The current episode started more than 1 year ago. The problem is unchanged. The problem is controlled. Associated symptoms include anxiety. Pertinent negatives include no chest pain, palpitations or shortness of breath. There are no associated agents to hypertension. The current treatment provides significant improvement. There are no compliance problems.    Anxiety  Presents for follow-up visit. Patient reports no chest pain, palpitations or shortness of breath. Symptoms occur occasionally. The severity of symptoms is mild. The quality of sleep is good. Nighttime awakenings: none.     Compliance with medications is %.   Skin Problem  This is a chronic problem. The current episode started more than 1 year ago. The problem occurs constantly. The problem has been gradually worsening. Associated symptoms include arthralgias (Right shoulder-moderate-severe depending on the job he is doing) and fatigue (moderate). Pertinent negatives include no chest pain or joint swelling. Associated symptoms comments: Changing in size. Nothing aggravates the symptoms. He has tried nothing for the symptoms. The treatment provided no relief.        The following portions of the patient's history were reviewed and updated as appropriate: allergies, current  medications, past family history, past medical history, past social history, past surgical history and problem list.    Past Medical History:   Diagnosis Date   • Agoraphobia    • Anxiety    • HDL deficiency    • Hyperlipidemia    • Hypertension    • Malaise and fatigue    • Near syncope    • Overweight    • Skin abnormality        History reviewed. No pertinent surgical history.     Family History   Problem Relation Age of Onset   • Coronary artery disease Mother 52        and/or Hypertension; valve repair at 52 living at 55.   • Stroke Mother 52   • Hyperlipidemia Mother         lipid in future   • Hypertension Father    • Other Father         detatched retna bilateral   • Anxiety disorder Brother    • Hypertension Maternal Grandmother    • Diabetes Maternal Grandmother    • Hypertension Maternal Grandfather    • Colon cancer Paternal Grandmother    • Diabetes Paternal Grandmother    • Coronary artery disease Paternal Grandfather         and/or Hypertension   • Coronary artery disease Maternal Uncle         and/or Hypertension        Social History     Socioeconomic History   • Marital status: Single   Tobacco Use   • Smoking status: Former Smoker     Packs/day: 1.00     Years: 3.00     Pack years: 3.00     Types: Cigarettes     Quit date: 6/18/2019     Years since quitting: 3.1   • Smokeless tobacco: Current User     Types: Chew   • Tobacco comment: Patient has smoked 1 cigarette weekly since 6-2019   Vaping Use   • Vaping Use: Never used   Substance and Sexual Activity   • Alcohol use: Not Currently     Comment: Sober since 9-2020   • Drug use: Never   • Sexual activity: Not Currently         Review of Systems   Constitutional: Positive for fatigue (moderate).   HENT: Positive for hearing loss.    Respiratory: Negative for shortness of breath.    Cardiovascular: Negative for chest pain and palpitations.   Genitourinary: Negative for frequency.        Nocturia   Musculoskeletal: Positive for arthralgias (Right  "shoulder-moderate-severe depending on the job he is doing) and back pain. Negative for joint swelling.   Neurological: Negative for memory problem.       Objective   Visit Vitals  /80 (BP Location: Left arm, Patient Position: Sitting, Cuff Size: Adult)   Pulse 100   Temp 97.5 °F (36.4 °C) (Temporal)   Resp 18   Ht 177.8 cm (70\")   Wt 94.8 kg (209 lb)   SpO2 97%   BMI 29.99 kg/m²     Physical Exam  Vitals and nursing note reviewed.   Constitutional:       Appearance: He is well-developed.   HENT:      Head: Normocephalic.        Comments: Multicolored pigmented lesion on right cheek  Neck:      Thyroid: No thyromegaly.      Vascular: No carotid bruit.      Trachea: Trachea normal.   Cardiovascular:      Rate and Rhythm: Normal rate and regular rhythm.      Heart sounds: No murmur heard.    No friction rub. No gallop.   Pulmonary:      Effort: Pulmonary effort is normal. No respiratory distress.      Breath sounds: Normal breath sounds. No wheezing.   Chest:      Chest wall: No tenderness.   Musculoskeletal:      Right shoulder: Normal.      Cervical back: Neck supple.      Lumbar back: Normal.   Skin:     General: Skin is dry.      Findings: No rash.      Nails: There is no clubbing.   Neurological:      Mental Status: He is alert and oriented to person, place, and time.   Psychiatric:         Behavior: Behavior is cooperative.         Assessment & Plan   Problem List Items Addressed This Visit        Medium    Agoraphobia    Current Assessment & Plan     Greatly Improved with Celexa         Essential hypertension    Current Assessment & Plan     Doing well.  Patient tolerated Lisinopril well without side effects. I feel the benefits of the medication outweigh the risks.  Encouraged to watch salt, exercise more and lose weight.           Mixed hyperlipidemia    Current Assessment & Plan     Patient given order for fasting labs.  He will be scheduled to return for shared decision making regarding the results   "       Relevant Orders    Lipid Panel With / Chol / HDL Ratio    Comprehensive metabolic panel       Unprioritized    Chronic bilateral low back pain    Current Assessment & Plan     New dx.  Start Mobic 15 mg daily.  Benefits and risk of medication discussed I feel benefits outweigh the risk--offered Xray, pt. Declines.         Relevant Medications    meloxicam (MOBIC) 15 MG tablet    Chronic right shoulder pain - Primary    Current Assessment & Plan     New dx.  Start Mobic 15 mg daily.  Benefits and risk of medication discussed I feel benefits outweigh the risk --offered Xray, pt. Declines.         Relevant Medications    meloxicam (MOBIC) 15 MG tablet    Myalgia    Current Assessment & Plan     New dx.  Patient given order for CPK--he is scheduled visit to return to discuss results for shared decision making         Relevant Orders    CK    Neoplasm, uncertain whether benign or malignant    Current Assessment & Plan     New dx.  Right cheek, advised excision and scheduled for 10-           Other Visit Diagnoses     Other depression

## 2022-08-22 NOTE — ASSESSMENT & PLAN NOTE
New dx.  Patient given order for CPK--he is scheduled visit to return to discuss results for shared decision making

## 2022-08-22 NOTE — ASSESSMENT & PLAN NOTE
Stable.  Patient tolerated Celexa well without side effects. I feel the benefits of the medication outweigh the risks.

## 2022-08-22 NOTE — ASSESSMENT & PLAN NOTE
Patient given order for fasting labs.  He will be scheduled to return for shared decision making regarding the results

## 2022-08-22 NOTE — ASSESSMENT & PLAN NOTE
New dx.  Start Mobic 15 mg daily.  Benefits and risk of medication discussed I feel benefits outweigh the risk --offered Xray, pt. Declines.

## 2022-08-30 LAB
ALBUMIN SERPL-MCNC: 5 G/DL (ref 4.1–5.2)
ALBUMIN/GLOB SERPL: 2.2 {RATIO} (ref 1.2–2.2)
ALP SERPL-CCNC: 66 IU/L (ref 44–121)
ALT SERPL-CCNC: 40 IU/L (ref 0–44)
AST SERPL-CCNC: 34 IU/L (ref 0–40)
BILIRUB SERPL-MCNC: 0.3 MG/DL (ref 0–1.2)
BUN SERPL-MCNC: 22 MG/DL (ref 6–20)
BUN/CREAT SERPL: 26 (ref 9–20)
CALCIUM SERPL-MCNC: 9.5 MG/DL (ref 8.7–10.2)
CHLORIDE SERPL-SCNC: 105 MMOL/L (ref 96–106)
CHOLEST SERPL-MCNC: 135 MG/DL (ref 100–199)
CHOLEST/HDLC SERPL: 3.6 RATIO (ref 0–5)
CK SERPL-CCNC: 140 U/L (ref 49–439)
CO2 SERPL-SCNC: 24 MMOL/L (ref 20–29)
CREAT SERPL-MCNC: 0.84 MG/DL (ref 0.76–1.27)
EGFRCR-CYS SERPLBLD CKD-EPI 2021: 120 ML/MIN/1.73
GLOBULIN SER CALC-MCNC: 2.3 G/DL (ref 1.5–4.5)
GLUCOSE SERPL-MCNC: 92 MG/DL (ref 65–99)
HDLC SERPL-MCNC: 37 MG/DL
LDLC SERPL CALC-MCNC: 81 MG/DL (ref 0–99)
POTASSIUM SERPL-SCNC: 4.7 MMOL/L (ref 3.5–5.2)
PROT SERPL-MCNC: 7.3 G/DL (ref 6–8.5)
SODIUM SERPL-SCNC: 142 MMOL/L (ref 134–144)
TRIGL SERPL-MCNC: 88 MG/DL (ref 0–149)
VLDLC SERPL CALC-MCNC: 17 MG/DL (ref 5–40)

## 2022-08-31 ENCOUNTER — OFFICE VISIT (OUTPATIENT)
Dept: FAMILY MEDICINE CLINIC | Facility: CLINIC | Age: 32
End: 2022-08-31

## 2022-08-31 VITALS
TEMPERATURE: 97.5 F | SYSTOLIC BLOOD PRESSURE: 124 MMHG | HEART RATE: 101 BPM | DIASTOLIC BLOOD PRESSURE: 80 MMHG | OXYGEN SATURATION: 97 % | WEIGHT: 212 LBS | BODY MASS INDEX: 30.35 KG/M2 | RESPIRATION RATE: 18 BRPM | HEIGHT: 70 IN

## 2022-08-31 DIAGNOSIS — M54.50 CHRONIC BILATERAL LOW BACK PAIN WITHOUT SCIATICA: ICD-10-CM

## 2022-08-31 DIAGNOSIS — M79.10 MYALGIA: ICD-10-CM

## 2022-08-31 DIAGNOSIS — Z20.822 SUSPECTED COVID-19 VIRUS INFECTION: ICD-10-CM

## 2022-08-31 DIAGNOSIS — F41.9 ANXIETY: ICD-10-CM

## 2022-08-31 DIAGNOSIS — R03.0 ELEVATED BLOOD PRESSURE READING: ICD-10-CM

## 2022-08-31 DIAGNOSIS — E66.3 OVERWEIGHT: ICD-10-CM

## 2022-08-31 DIAGNOSIS — D48.9 NEOPLASM, UNCERTAIN WHETHER BENIGN OR MALIGNANT: ICD-10-CM

## 2022-08-31 DIAGNOSIS — I49.8 SINUS ARRHYTHMIA: ICD-10-CM

## 2022-08-31 DIAGNOSIS — R53.83 MALAISE AND FATIGUE: ICD-10-CM

## 2022-08-31 DIAGNOSIS — Z00.01 ENCOUNTER FOR GENERAL ADULT MEDICAL EXAMINATION WITH ABNORMAL FINDINGS: ICD-10-CM

## 2022-08-31 DIAGNOSIS — Z48.02 VISIT FOR SUTURE REMOVAL: ICD-10-CM

## 2022-08-31 DIAGNOSIS — Z82.49 FAMILY HISTORY OF HEART DISEASE: ICD-10-CM

## 2022-08-31 DIAGNOSIS — M25.511 CHRONIC RIGHT SHOULDER PAIN: ICD-10-CM

## 2022-08-31 DIAGNOSIS — L98.9 SKIN ABNORMALITY: ICD-10-CM

## 2022-08-31 DIAGNOSIS — E83.52 HYPERCALCEMIA: ICD-10-CM

## 2022-08-31 DIAGNOSIS — M19.90 ARTHRITIS: ICD-10-CM

## 2022-08-31 DIAGNOSIS — I10 ESSENTIAL HYPERTENSION: ICD-10-CM

## 2022-08-31 DIAGNOSIS — F32.89 OTHER DEPRESSION: ICD-10-CM

## 2022-08-31 DIAGNOSIS — I45.9 INTRAVENTRICULAR CONDUCTION DELAY: ICD-10-CM

## 2022-08-31 DIAGNOSIS — E78.2 MIXED HYPERLIPIDEMIA: Primary | ICD-10-CM

## 2022-08-31 DIAGNOSIS — R53.81 MALAISE AND FATIGUE: ICD-10-CM

## 2022-08-31 DIAGNOSIS — G89.29 CHRONIC RIGHT SHOULDER PAIN: ICD-10-CM

## 2022-08-31 DIAGNOSIS — F40.00 AGORAPHOBIA: ICD-10-CM

## 2022-08-31 DIAGNOSIS — J33.9 NASAL POLYP: ICD-10-CM

## 2022-08-31 DIAGNOSIS — G89.29 CHRONIC BILATERAL LOW BACK PAIN WITHOUT SCIATICA: ICD-10-CM

## 2022-08-31 PROCEDURE — 99214 OFFICE O/P EST MOD 30 MIN: CPT | Performed by: FAMILY MEDICINE

## 2022-08-31 PROCEDURE — 99395 PREV VISIT EST AGE 18-39: CPT | Performed by: FAMILY MEDICINE

## 2022-08-31 RX ORDER — LISINOPRIL 10 MG/1
10 TABLET ORAL DAILY
Qty: 90 TABLET | Refills: 3 | Status: SHIPPED | OUTPATIENT
Start: 2022-08-31 | End: 2023-03-27 | Stop reason: SDUPTHER

## 2022-08-31 RX ORDER — ATORVASTATIN CALCIUM 10 MG/1
10 TABLET, FILM COATED ORAL DAILY
Qty: 90 TABLET | Refills: 3 | Status: SHIPPED | OUTPATIENT
Start: 2022-08-31 | End: 2023-03-27 | Stop reason: SDUPTHER

## 2022-08-31 RX ORDER — CITALOPRAM 20 MG/1
20 TABLET ORAL DAILY
Qty: 90 TABLET | Refills: 3 | Status: SHIPPED | OUTPATIENT
Start: 2022-08-31 | End: 2023-03-27 | Stop reason: SDUPTHER

## 2022-10-17 ENCOUNTER — PROCEDURE VISIT (OUTPATIENT)
Dept: FAMILY MEDICINE CLINIC | Facility: CLINIC | Age: 32
End: 2022-10-17

## 2022-10-17 VITALS
RESPIRATION RATE: 18 BRPM | DIASTOLIC BLOOD PRESSURE: 78 MMHG | OXYGEN SATURATION: 96 % | BODY MASS INDEX: 30.06 KG/M2 | HEART RATE: 108 BPM | HEIGHT: 70 IN | TEMPERATURE: 98.4 F | WEIGHT: 210 LBS | SYSTOLIC BLOOD PRESSURE: 118 MMHG

## 2022-10-17 DIAGNOSIS — D48.9 NEOPLASM, UNCERTAIN WHETHER BENIGN OR MALIGNANT: Primary | ICD-10-CM

## 2022-10-17 PROCEDURE — 11441 EXC FACE-MM B9+MARG 0.6-1 CM: CPT | Performed by: FAMILY MEDICINE

## 2022-10-17 NOTE — ASSESSMENT & PLAN NOTE
Right cheek hyperpigmented, rule out Melanoma.   Lesion measures 3 mm by 4 mm, defect is .6 cm x  1.2 cm.  Area cleansed with betadine, anesthestized with 1% epinephrine, excised with a 15 blade, superficial wound closed with 5.0 nylon. Sutures used x  3

## 2022-10-17 NOTE — PROGRESS NOTES
Subjective   Matt Moore is a 32 y.o. male.   Chief Complaint   Patient presents with   • Skin Lesion     History of Present Illness  Skin lesion of the right cheek, raised, itching and bleeding.  It also has become darker       The following portions of the patient's history were reviewed and updated as appropriate: allergies, current medications, past family history, past medical history, past social history, past surgical history and problem list.    Past Medical History:   Diagnosis Date   • Agoraphobia    • Anxiety    • HDL deficiency    • Hyperlipidemia    • Hypertension    • Malaise and fatigue    • Near syncope    • Overweight    • Skin abnormality        History reviewed. No pertinent surgical history.     Family History   Problem Relation Age of Onset   • Coronary artery disease Mother 52        and/or Hypertension; valve repair at 52 living at 55.   • Stroke Mother 52   • Hyperlipidemia Mother         lipid in future   • Heart disease Father         A-Fib   • Hypertension Father    • Other Father         detatched retna bilateral   • Anxiety disorder Brother    • Coronary artery disease Maternal Uncle         and/or Hypertension   • Hypertension Maternal Grandmother    • Diabetes Maternal Grandmother    • Hypertension Maternal Grandfather    • Colon cancer Paternal Grandmother    • Diabetes Paternal Grandmother    • Coronary artery disease Paternal Grandfather         and/or Hypertension        Social History     Socioeconomic History   • Marital status: Single   Tobacco Use   • Smoking status: Former     Packs/day: 1.00     Years: 3.00     Pack years: 3.00     Types: Cigarettes     Start date: 4/5/2016     Quit date: 6/18/2019     Years since quitting: 3.3   • Smokeless tobacco: Current     Types: Chew   • Tobacco comments:     Patient has smoked 1 cigarette weekly since 6-2019   Vaping Use   • Vaping Use: Never used   Substance and Sexual Activity   • Alcohol use: Not Currently     Comment: Sober since  "9-2020   • Drug use: Never   • Sexual activity: Not Currently         Review of Systems   Constitutional: Negative for chills, diaphoresis and fatigue.   Skin: Positive for skin lesions.       Objective   Visit Vitals  /78 (BP Location: Left arm, Patient Position: Standing, Cuff Size: Adult)   Pulse 108   Temp 98.4 °F (36.9 °C) (Temporal)   Resp 18   Ht 177.8 cm (70\")   Wt 95.3 kg (210 lb)   SpO2 96%   BMI 30.13 kg/m²     Physical Exam  HENT:      Head:        Comments: Hyperpigmented lesion on right cheek which is multicolored        Assessment & Plan   Problem List Items Addressed This Visit        Unprioritized    Neoplasm, uncertain whether benign or malignant - Primary    Current Assessment & Plan     Right cheek hyperpigmented, rule out Melanoma.   Lesion measures 3 mm by 4 mm, defect is .6 cm x  1.2 cm.  Area cleansed with betadine, anesthestized with 1% epinephrine, excised with a 15 blade, superficial wound closed with 5.0 nylon. Sutures used x  3         Relevant Orders    Reference Histopathology (Completed)                "

## 2022-10-24 ENCOUNTER — OFFICE VISIT (OUTPATIENT)
Dept: FAMILY MEDICINE CLINIC | Facility: CLINIC | Age: 32
End: 2022-10-24

## 2022-10-24 VITALS
BODY MASS INDEX: 30.24 KG/M2 | HEART RATE: 84 BPM | OXYGEN SATURATION: 99 % | RESPIRATION RATE: 18 BRPM | HEIGHT: 70 IN | SYSTOLIC BLOOD PRESSURE: 126 MMHG | WEIGHT: 211.2 LBS | TEMPERATURE: 97.5 F | DIASTOLIC BLOOD PRESSURE: 78 MMHG

## 2022-10-24 DIAGNOSIS — Z48.02 VISIT FOR SUTURE REMOVAL: Primary | ICD-10-CM

## 2022-10-24 DIAGNOSIS — D22.39 MELANOCYTIC NEVUS OF FACE, OTHER LOCATION: ICD-10-CM

## 2022-10-24 PROCEDURE — 99024 POSTOP FOLLOW-UP VISIT: CPT | Performed by: FAMILY MEDICINE

## 2022-10-24 NOTE — PROGRESS NOTES
Subjective   Matt Moore is a 32 y.o. male.   Chief Complaint   Patient presents with   • Suture / Staple Removal     Suture / Staple Removal  The sutures were placed 7 to 10 days ago. He tried nothing since the wound repair. The treatment provided significant relief. His temperature was unmeasured prior to arrival. There has been no drainage from the wound. There is no redness present. There is no swelling present. There is no pain present. He has no difficulty moving the affected extremity or digit.        The following portions of the patient's history were reviewed and updated as appropriate: allergies, current medications, past family history, past medical history, past social history, past surgical history and problem list.    Past Medical History:   Diagnosis Date   • Agoraphobia    • Anxiety    • HDL deficiency    • Hyperlipidemia    • Hypertension    • Malaise and fatigue    • Near syncope    • Overweight    • Skin abnormality        History reviewed. No pertinent surgical history.     Family History   Problem Relation Age of Onset   • Coronary artery disease Mother 52        and/or Hypertension; valve repair at 52 living at 55.   • Stroke Mother 52   • Hyperlipidemia Mother         lipid in future   • Heart disease Father         A-Fib   • Hypertension Father    • Other Father         detatched retna bilateral   • Anxiety disorder Brother    • Coronary artery disease Maternal Uncle         and/or Hypertension   • Hypertension Maternal Grandmother    • Diabetes Maternal Grandmother    • Hypertension Maternal Grandfather    • Colon cancer Paternal Grandmother    • Diabetes Paternal Grandmother    • Coronary artery disease Paternal Grandfather         and/or Hypertension        Social History     Socioeconomic History   • Marital status: Single   Tobacco Use   • Smoking status: Former     Packs/day: 1.00     Years: 3.00     Pack years: 3.00     Types: Cigarettes     Start date: 4/5/2016     Quit date:  "6/18/2019     Years since quitting: 3.3   • Smokeless tobacco: Current     Types: Chew   • Tobacco comments:     Patient has smoked 1 cigarette weekly since 6-2019   Vaping Use   • Vaping Use: Never used   Substance and Sexual Activity   • Alcohol use: Not Currently     Comment: Sober since 9-2020   • Drug use: Never   • Sexual activity: Not Currently         Review of Systems   Constitutional: Negative for chills, diaphoresis, fatigue and fever.   Skin: Negative for rash and bruise.       Objective   Visit Vitals  /78 (BP Location: Right arm, Patient Position: Sitting, Cuff Size: Adult)   Pulse 84   Temp 97.5 °F (36.4 °C) (Temporal)   Resp 18   Ht 177.8 cm (70\")   Wt 95.8 kg (211 lb 3.2 oz)   SpO2 99%   BMI 30.30 kg/m²     Physical Exam  HENT:      Head:        Comments: Wound well-healed sutures look good Armond ptotic nevus right cheek he is to watch for changes        Assessment & Plan   Problem List Items Addressed This Visit        Low    Melanocytic nevus    Overview     Right cheek -- he is to watch for changes         Current Assessment & Plan     Advised him margins were not clear            Unprioritized    Visit for suture removal - Primary    Current Assessment & Plan     # 3 sutures removed from right cheek.  Wound healing well.  Pathology reviewed with patient.                     "

## 2022-10-30 PROBLEM — D22.9 MELANOCYTIC NEVUS: Status: ACTIVE | Noted: 2022-10-30

## 2023-03-13 ENCOUNTER — OFFICE VISIT (OUTPATIENT)
Dept: FAMILY MEDICINE CLINIC | Facility: CLINIC | Age: 33
End: 2023-03-13
Payer: COMMERCIAL

## 2023-03-13 DIAGNOSIS — E78.2 MIXED HYPERLIPIDEMIA: Primary | ICD-10-CM

## 2023-03-14 LAB
ALBUMIN SERPL-MCNC: 4.8 G/DL (ref 4–5)
ALBUMIN/GLOB SERPL: 1.9 {RATIO} (ref 1.2–2.2)
ALP SERPL-CCNC: 70 IU/L (ref 44–121)
ALT SERPL-CCNC: 42 IU/L (ref 0–44)
AST SERPL-CCNC: 30 IU/L (ref 0–40)
BILIRUB SERPL-MCNC: 0.3 MG/DL (ref 0–1.2)
BUN SERPL-MCNC: 14 MG/DL (ref 6–20)
BUN/CREAT SERPL: 17 (ref 9–20)
CALCIUM SERPL-MCNC: 10 MG/DL (ref 8.7–10.2)
CHLORIDE SERPL-SCNC: 102 MMOL/L (ref 96–106)
CHOLEST SERPL-MCNC: 117 MG/DL (ref 100–199)
CHOLEST/HDLC SERPL: 3.4 RATIO (ref 0–5)
CO2 SERPL-SCNC: 26 MMOL/L (ref 20–29)
CREAT SERPL-MCNC: 0.82 MG/DL (ref 0.76–1.27)
EGFRCR SERPLBLD CKD-EPI 2021: 120 ML/MIN/1.73
GLOBULIN SER CALC-MCNC: 2.5 G/DL (ref 1.5–4.5)
GLUCOSE SERPL-MCNC: 99 MG/DL (ref 70–99)
HDLC SERPL-MCNC: 34 MG/DL
LDLC SERPL CALC-MCNC: 66 MG/DL (ref 0–99)
POTASSIUM SERPL-SCNC: 4.7 MMOL/L (ref 3.5–5.2)
PROT SERPL-MCNC: 7.3 G/DL (ref 6–8.5)
SODIUM SERPL-SCNC: 142 MMOL/L (ref 134–144)
TRIGL SERPL-MCNC: 87 MG/DL (ref 0–149)
VLDLC SERPL CALC-MCNC: 17 MG/DL (ref 5–40)

## 2023-03-27 ENCOUNTER — OFFICE VISIT (OUTPATIENT)
Dept: FAMILY MEDICINE CLINIC | Facility: CLINIC | Age: 33
End: 2023-03-27
Payer: COMMERCIAL

## 2023-03-27 VITALS
HEIGHT: 70 IN | DIASTOLIC BLOOD PRESSURE: 74 MMHG | BODY MASS INDEX: 30.26 KG/M2 | TEMPERATURE: 97.7 F | WEIGHT: 211.4 LBS | OXYGEN SATURATION: 98 % | RESPIRATION RATE: 18 BRPM | HEART RATE: 111 BPM | SYSTOLIC BLOOD PRESSURE: 116 MMHG

## 2023-03-27 DIAGNOSIS — I10 ESSENTIAL HYPERTENSION: Primary | ICD-10-CM

## 2023-03-27 DIAGNOSIS — E78.2 MIXED HYPERLIPIDEMIA: ICD-10-CM

## 2023-03-27 DIAGNOSIS — F41.9 ANXIETY: ICD-10-CM

## 2023-03-27 DIAGNOSIS — F32.89 OTHER DEPRESSION: ICD-10-CM

## 2023-03-27 DIAGNOSIS — E66.3 OVERWEIGHT: ICD-10-CM

## 2023-03-27 DIAGNOSIS — F40.00 AGORAPHOBIA: ICD-10-CM

## 2023-03-27 RX ORDER — LISINOPRIL 10 MG/1
10 TABLET ORAL DAILY
Qty: 90 TABLET | Refills: 3
Start: 2023-03-27

## 2023-03-27 RX ORDER — ATORVASTATIN CALCIUM 10 MG/1
10 TABLET, FILM COATED ORAL DAILY
Qty: 90 TABLET | Refills: 3
Start: 2023-03-27

## 2023-03-27 RX ORDER — CITALOPRAM 20 MG/1
20 TABLET ORAL DAILY
Qty: 90 TABLET | Refills: 3
Start: 2023-03-27

## 2023-03-27 NOTE — PROGRESS NOTES
Subjective   Matt Moore is a 33 y.o. male.     Hypertension  This is a chronic problem. The current episode started more than 1 year ago. The problem is unchanged. The problem is controlled. Pertinent negatives include no chest pain, palpitations or shortness of breath.   Hyperlipidemia  This is a chronic problem. The current episode started more than 1 year ago. The problem is controlled. Recent lipid tests were reviewed and are high. Factors aggravating his hyperlipidemia include fatty foods. Pertinent negatives include no chest pain, myalgias or shortness of breath. Current antihyperlipidemic treatment includes statins. The current treatment provides significant improvement of lipids.        The following portions of the patient's history were reviewed and updated as appropriate: allergies, current medications, past family history, past medical history, past social history, past surgical history and problem list.    Family History   Problem Relation Age of Onset   • Coronary artery disease Mother 52        and/or Hypertension; valve repair at 52 living at 55.   • Stroke Mother 52   • Hyperlipidemia Mother         lipid in future   • Heart disease Father         A-Fib   • Hypertension Father    • Other Father         detatched retna bilateral   • Anxiety disorder Brother    • Coronary artery disease Maternal Uncle         and/or Hypertension   • Hypertension Maternal Grandmother    • Diabetes Maternal Grandmother    • Hypertension Maternal Grandfather    • Colon cancer Paternal Grandmother    • Diabetes Paternal Grandmother    • Coronary artery disease Paternal Grandfather         and/or Hypertension       Social History     Tobacco Use   • Smoking status: Former     Packs/day: 1.00     Years: 3.00     Pack years: 3.00     Types: Cigarettes     Start date: 4/5/2016     Quit date: 6/18/2019     Years since quitting: 3.7   • Smokeless tobacco: Current     Types: Chew   • Tobacco comments:     Patient has smoked 1  cigarette weekly since 6-2019   Vaping Use   • Vaping Use: Never used   Substance Use Topics   • Alcohol use: Not Currently     Comment: Sober since 9-2020   • Drug use: Never       History reviewed. No pertinent surgical history.    Patient Active Problem List   Diagnosis   • Agoraphobia   • Anxiety   • Essential hypertension   • Mixed hyperlipidemia   • Arthritis   • Encounter for general adult medical examination with abnormal findings   • Sinus arrhythmia   • Intraventricular conduction delay   • Suspected COVID-19 virus infection   • Family history of heart disease   • Overweight   • Myalgia   • Chronic right shoulder pain   • Chronic bilateral low back pain   • Nasal polyp   • Neoplasm, uncertain whether benign or malignant   • Visit for suture removal   • Melanocytic nevus       Current Outpatient Medications on File Prior to Visit   Medication Sig Dispense Refill   • [DISCONTINUED] atorvastatin (Lipitor) 10 MG tablet Take 1 tablet by mouth Daily. 90 tablet 3   • [DISCONTINUED] citalopram (CeleXA) 20 MG tablet Take 1 tablet by mouth Daily. 90 tablet 3   • [DISCONTINUED] lisinopril (PRINIVIL,ZESTRIL) 10 MG tablet Take 1 tablet by mouth Daily. 90 tablet 3   • meloxicam (MOBIC) 15 MG tablet Take 1 tablet by mouth Daily. (Patient not taking: Reported on 3/27/2023) 30 tablet 12     No current facility-administered medications on file prior to visit.       No Known Allergies    Review of Systems   Constitutional: Negative for fatigue, unexpected weight gain and unexpected weight loss.   Respiratory: Negative for shortness of breath.    Cardiovascular: Negative for chest pain, palpitations and leg swelling.   Gastrointestinal: Negative for nausea.   Musculoskeletal: Negative for myalgias.   Skin: Negative for dry skin.   Neurological: Negative for headache.       Objective   Visit Vitals  /74 (BP Location: Left arm, Patient Position: Sitting, Cuff Size: Large Adult)   Pulse 111   Temp 97.7 °F (36.5 °C)  "(Temporal)   Resp 18   Ht 177.8 cm (70\")   Wt 95.9 kg (211 lb 6.4 oz)   SpO2 98%   BMI 30.33 kg/m²     Physical Exam  Vitals and nursing note reviewed.   Constitutional:       Appearance: He is well-developed.   HENT:      Head: Normocephalic.   Neck:      Thyroid: No thyromegaly.      Vascular: No carotid bruit.      Trachea: Trachea normal.   Cardiovascular:      Rate and Rhythm: Normal rate and regular rhythm.      Heart sounds: No murmur heard.    No friction rub. No gallop.   Pulmonary:      Effort: Pulmonary effort is normal. No respiratory distress.      Breath sounds: Normal breath sounds. No wheezing.   Chest:      Chest wall: No tenderness.   Musculoskeletal:      Cervical back: Neck supple.   Skin:     General: Skin is dry.      Findings: No rash.      Nails: There is no clubbing.   Neurological:      Mental Status: He is alert and oriented to person, place, and time.   Psychiatric:         Behavior: Behavior is cooperative.           Assessment & Plan .  Problem List Items Addressed This Visit        Medium    Agoraphobia    Current Assessment & Plan     Doing well.  Patient tolerated Celexa well without side effects. I feel the benefits of the medication outweigh the risks.         Relevant Medications    citalopram (CeleXA) 20 MG tablet    Anxiety    Current Assessment & Plan     Doing well. Patient tolerated Celexa well without side effects. I feel the benefits of the medication outweigh the risks.         Essential hypertension - Primary    Current Assessment & Plan     Doing well.  Patient tolerated Lisinopril well without side effects. I feel the benefits of the medication outweigh the risks.  Encouraged to watch salt, exercise more and lose weight.           Relevant Medications    lisinopril (PRINIVIL,ZESTRIL) 10 MG tablet    Mixed hyperlipidemia    Current Assessment & Plan     --Lipid and CMP done 3-, read by me, reviewed with pt.  Trig. 97 down from 88, Tot.chol. 117 down from 135, HDL " 34 down from 37, LDL 66 down from 81.  Improved.  Close to goal.  Encouraged to watch fatty intake, exercise more, and lose weight.   Compliant with medication.  Patient tolerated Lipitor well without side effects. I feel the benefits of the medication outweigh the risks.  Is getting adequate diet and exercise.  Goals developed at last visit were met.  Follow up in  6 months  Care management needs are self-addressed.  Self-management abilities addressed and patient is capable of managing his own disease.           Relevant Medications    atorvastatin (Lipitor) 10 MG tablet       Unprioritized    Overweight    Current Assessment & Plan     Patient's (Body mass index is 30.33 kg/m².) indicates that they are overweight with health conditions that include hypertension and dyslipidemias . Weight is unchanged. BMI is is above average; BMI management plan is completed. We discussed portion control and increasing exercise.         Other Visit Diagnoses     Other depression        Relevant Medications    citalopram (CeleXA) 20 MG tablet

## 2023-03-27 NOTE — ASSESSMENT & PLAN NOTE
--Lipid and CMP done 3-, read by me, reviewed with pt.  Trig. 97 down from 88, Tot.chol. 117 down from 135, HDL 34 down from 37, LDL 66 down from 81.  Improved.  Close to goal.  Encouraged to watch fatty intake, exercise more, and lose weight.   Compliant with medication.  Patient tolerated Lipitor well without side effects. I feel the benefits of the medication outweigh the risks.  Is getting adequate diet and exercise.  Goals developed at last visit were met.  Follow up in  6 months  Care management needs are self-addressed.  Self-management abilities addressed and patient is capable of managing his own disease.

## 2023-03-27 NOTE — ASSESSMENT & PLAN NOTE
Patient's (Body mass index is 30.33 kg/m².) indicates that they are overweight with health conditions that include hypertension and dyslipidemias . Weight is unchanged. BMI is is above average; BMI management plan is completed. We discussed portion control and increasing exercise.

## 2023-09-08 DIAGNOSIS — E78.2 MIXED HYPERLIPIDEMIA: ICD-10-CM

## 2023-09-11 RX ORDER — ATORVASTATIN CALCIUM 10 MG/1
TABLET, FILM COATED ORAL
Qty: 90 TABLET | Refills: 0 | Status: SHIPPED | OUTPATIENT
Start: 2023-09-11

## 2023-10-18 DIAGNOSIS — F32.89 OTHER DEPRESSION: ICD-10-CM

## 2023-10-18 DIAGNOSIS — I10 ESSENTIAL HYPERTENSION: ICD-10-CM

## 2023-10-18 RX ORDER — LISINOPRIL 10 MG/1
10 TABLET ORAL DAILY
Qty: 90 TABLET | Refills: 0 | Status: SHIPPED | OUTPATIENT
Start: 2023-10-18

## 2023-10-18 RX ORDER — CITALOPRAM 20 MG/1
20 TABLET ORAL DAILY
Qty: 90 TABLET | Refills: 0 | Status: SHIPPED | OUTPATIENT
Start: 2023-10-18

## 2023-10-31 ENCOUNTER — OFFICE VISIT (OUTPATIENT)
Dept: FAMILY MEDICINE CLINIC | Facility: CLINIC | Age: 33
End: 2023-10-31
Payer: COMMERCIAL

## 2023-10-31 VITALS
HEIGHT: 70 IN | BODY MASS INDEX: 31.3 KG/M2 | OXYGEN SATURATION: 97 % | DIASTOLIC BLOOD PRESSURE: 91 MMHG | WEIGHT: 218.6 LBS | RESPIRATION RATE: 18 BRPM | TEMPERATURE: 98 F | HEART RATE: 92 BPM | SYSTOLIC BLOOD PRESSURE: 145 MMHG

## 2023-10-31 DIAGNOSIS — F41.9 ANXIETY: ICD-10-CM

## 2023-10-31 DIAGNOSIS — E78.2 MIXED HYPERLIPIDEMIA: ICD-10-CM

## 2023-10-31 DIAGNOSIS — F40.00 AGORAPHOBIA: ICD-10-CM

## 2023-10-31 DIAGNOSIS — I45.9 INTRAVENTRICULAR CONDUCTION DELAY: ICD-10-CM

## 2023-10-31 DIAGNOSIS — I49.8 SINUS ARRHYTHMIA: ICD-10-CM

## 2023-10-31 DIAGNOSIS — I10 ESSENTIAL HYPERTENSION: Primary | ICD-10-CM

## 2023-10-31 NOTE — ASSESSMENT & PLAN NOTE
EKG done today, read by me, reviewed with pt.  EKG showed NSR with questionable LVH, sinus arrhythmia improved and stable from 12-17-21

## 2023-10-31 NOTE — PROGRESS NOTES
Subjective   Matt Moore is a 33 y.o. male.     History of Present Illness  Follow up agoraphobia.  Hypertension  This is a chronic problem. The current episode started more than 1 year ago. The problem has been gradually worsening since onset. Associated symptoms include anxiety. Pertinent negatives include no chest pain, palpitations or shortness of breath. Risk factors for coronary artery disease include dyslipidemia. The current treatment provides no improvement. There are no compliance problems.    Anxiety  Presents for follow-up visit. Patient reports no chest pain, palpitations or shortness of breath. Symptoms occur occasionally. The quality of sleep is good.     Compliance with medications is %.   Heart Problem  This is a chronic problem. The current episode started more than 1 year ago. The problem occurs constantly. The problem has been unchanged. Pertinent negatives include no chest pain, fatigue or joint swelling. The treatment provided no relief.        The following portions of the patient's history were reviewed and updated as appropriate: allergies, current medications, past family history, past medical history, past social history, past surgical history, and problem list.    Family History   Problem Relation Age of Onset    Coronary artery disease Mother 52        and/or Hypertension; valve repair at 52 living at 55.    Stroke Mother 52    Hyperlipidemia Mother         lipid in future    Heart disease Father         A-Fib    Hypertension Father     Other Father         detatched retna bilateral    Anxiety disorder Brother     Coronary artery disease Maternal Uncle         and/or Hypertension    Hypertension Maternal Grandmother     Diabetes Maternal Grandmother     Hypertension Maternal Grandfather     Colon cancer Paternal Grandmother     Diabetes Paternal Grandmother     Coronary artery disease Paternal Grandfather         and/or Hypertension       Social History     Tobacco Use    Smoking  status: Former     Packs/day: 1.00     Years: 3.00     Additional pack years: 0.00     Total pack years: 3.00     Types: Cigarettes     Start date: 2016     Quit date: 2019     Years since quittin.3    Smokeless tobacco: Current     Types: Chew    Tobacco comments:     Patient has smoked 1 cigarette weekly since    Vaping Use    Vaping Use: Never used   Substance Use Topics    Alcohol use: Not Currently     Comment: Sober since     Drug use: Never       History reviewed. No pertinent surgical history.    Patient Active Problem List   Diagnosis    Agoraphobia    Anxiety    Essential hypertension    Mixed hyperlipidemia    Arthritis    Encounter for general adult medical examination with abnormal findings    Sinus arrhythmia    Intraventricular conduction delay    Suspected COVID-19 virus infection    Family history of heart disease    Overweight    Myalgia    Chronic right shoulder pain    Chronic bilateral low back pain    Nasal polyp    Neoplasm, uncertain whether benign or malignant    Visit for suture removal    Melanocytic nevus       Current Outpatient Medications on File Prior to Visit   Medication Sig Dispense Refill    atorvastatin (LIPITOR) 10 MG tablet TAKE 1 TABLET BY MOUTH EVERY DAY 90 tablet 0    citalopram (CeleXA) 20 MG tablet TAKE 1 TABLET BY MOUTH EVERY DAY 90 tablet 0    lisinopril (PRINIVIL,ZESTRIL) 10 MG tablet TAKE 1 TABLET BY MOUTH EVERY DAY 90 tablet 0    meloxicam (MOBIC) 15 MG tablet Take 1 tablet by mouth Daily. (Patient not taking: Reported on 10/31/2023) 30 tablet 12     No current facility-administered medications on file prior to visit.       No Known Allergies    Review of Systems   Constitutional:  Negative for fatigue.   HENT:  Negative for hearing loss.    Respiratory:  Negative for shortness of breath.    Cardiovascular:  Negative for chest pain and palpitations.   Genitourinary:  Negative for frequency.        Nocturia   Musculoskeletal:  Negative for  "joint swelling.   Neurological:  Negative for memory problem.       Objective   Visit Vitals  /91 (BP Location: Left arm, Patient Position: Sitting, Cuff Size: Adult)   Pulse 92   Temp 98 °F (36.7 °C) (Temporal)   Resp 18   Ht 177.8 cm (70\")   Wt 99.2 kg (218 lb 9.6 oz)   SpO2 97%   BMI 31.37 kg/m²     Physical Exam  Vitals and nursing note reviewed.   Constitutional:       Appearance: He is well-developed.   HENT:      Head: Normocephalic.   Neck:      Thyroid: No thyromegaly.      Vascular: No carotid bruit.      Trachea: Trachea normal.   Cardiovascular:      Rate and Rhythm: Normal rate and regular rhythm.      Heart sounds: No murmur heard.     No friction rub. No gallop.   Pulmonary:      Effort: Pulmonary effort is normal. No respiratory distress.      Breath sounds: Normal breath sounds. No wheezing.   Chest:      Chest wall: No tenderness.   Musculoskeletal:      Cervical back: Neck supple.   Skin:     General: Skin is dry.      Findings: No rash.      Nails: There is no clubbing.   Neurological:      Mental Status: He is alert and oriented to person, place, and time.   Psychiatric:         Behavior: Behavior is cooperative.           Assessment & Plan .  Problem List Items Addressed This Visit          Medium    Agoraphobia    Current Assessment & Plan     Doing well,  Patient tolerated Celexa well without side effects. I feel the benefits of the medication outweigh the risks.          Anxiety    Current Assessment & Plan     Doing well.  Patient tolerated Celexa well without side effects. I feel the benefits of the medication outweigh the risks.          Essential hypertension - Primary    Current Assessment & Plan     Worse.  Advised to keep B/P diary and bring at follow up.  Patient tolerated Lisinopril well without side effects. I feel the benefits of the medication outweigh the risks.   Encouraged to watch salt, exercise more and lose weight.            Mixed hyperlipidemia    Current Assessment " & Plan       Will order labs today and patient will return for results and shared decision making.           Relevant Orders    Comprehensive Metabolic Panel (Completed)    Lipid Panel With / Chol / HDL Ratio (Completed)       Unprioritized    Intraventricular conduction delay    Current Assessment & Plan     EKG done today, read by me, reviewed with pt.  EKG showed NSR with questionable LVH, sinus arrhythmia improved and stable from 12-17-21         Relevant Orders    ECG 12 Lead    Sinus arrhythmia    Overview     Found on EKG January 12, 2021 will follow conservatively present.         Current Assessment & Plan     EKG done today, read by me, reviewed with pt.  EKG showed NSR with questionable LVH, sinus arrhythmia improved.  Last EKG was 12-17-21         Relevant Orders    ECG 12 Lead

## 2023-10-31 NOTE — ASSESSMENT & PLAN NOTE
Doing well,  Patient tolerated Celexa well without side effects. I feel the benefits of the medication outweigh the risks.

## 2023-10-31 NOTE — ASSESSMENT & PLAN NOTE
Worse.  Advised to keep B/P diary and bring at follow up.  Patient tolerated Lisinopril well without side effects. I feel the benefits of the medication outweigh the risks.   Encouraged to watch salt, exercise more and lose weight.

## 2023-10-31 NOTE — ASSESSMENT & PLAN NOTE
EKG done today, read by me, reviewed with pt.  EKG showed NSR with questionable LVH, sinus arrhythmia improved.  Last EKG was 12-17-21

## 2023-11-01 LAB
ALBUMIN SERPL-MCNC: 4.8 G/DL (ref 4.1–5.1)
ALBUMIN/GLOB SERPL: 1.8 {RATIO} (ref 1.2–2.2)
ALP SERPL-CCNC: 74 IU/L (ref 44–121)
ALT SERPL-CCNC: 38 IU/L (ref 0–44)
AST SERPL-CCNC: 26 IU/L (ref 0–40)
BILIRUB SERPL-MCNC: 0.3 MG/DL (ref 0–1.2)
BUN SERPL-MCNC: 13 MG/DL (ref 6–20)
BUN/CREAT SERPL: 15 (ref 9–20)
CALCIUM SERPL-MCNC: 9.9 MG/DL (ref 8.7–10.2)
CHLORIDE SERPL-SCNC: 100 MMOL/L (ref 96–106)
CHOLEST SERPL-MCNC: 141 MG/DL (ref 100–199)
CHOLEST/HDLC SERPL: 4.3 RATIO (ref 0–5)
CO2 SERPL-SCNC: 26 MMOL/L (ref 20–29)
CREAT SERPL-MCNC: 0.86 MG/DL (ref 0.76–1.27)
EGFRCR SERPLBLD CKD-EPI 2021: 119 ML/MIN/1.73
GLOBULIN SER CALC-MCNC: 2.7 G/DL (ref 1.5–4.5)
GLUCOSE SERPL-MCNC: 94 MG/DL (ref 70–99)
HDLC SERPL-MCNC: 33 MG/DL
LDLC SERPL CALC-MCNC: 80 MG/DL (ref 0–99)
POTASSIUM SERPL-SCNC: 4.6 MMOL/L (ref 3.5–5.2)
PROT SERPL-MCNC: 7.5 G/DL (ref 6–8.5)
SODIUM SERPL-SCNC: 139 MMOL/L (ref 134–144)
TRIGL SERPL-MCNC: 158 MG/DL (ref 0–149)
VLDLC SERPL CALC-MCNC: 28 MG/DL (ref 5–40)

## 2023-11-06 ENCOUNTER — OFFICE VISIT (OUTPATIENT)
Dept: FAMILY MEDICINE CLINIC | Facility: CLINIC | Age: 33
End: 2023-11-06
Payer: COMMERCIAL

## 2023-11-06 VITALS
HEIGHT: 70 IN | HEART RATE: 83 BPM | OXYGEN SATURATION: 98 % | DIASTOLIC BLOOD PRESSURE: 70 MMHG | WEIGHT: 217.6 LBS | TEMPERATURE: 97.7 F | BODY MASS INDEX: 31.15 KG/M2 | SYSTOLIC BLOOD PRESSURE: 126 MMHG | RESPIRATION RATE: 18 BRPM

## 2023-11-06 DIAGNOSIS — E78.2 MIXED HYPERLIPIDEMIA: Primary | ICD-10-CM

## 2023-11-06 DIAGNOSIS — M25.511 CHRONIC RIGHT SHOULDER PAIN: ICD-10-CM

## 2023-11-06 DIAGNOSIS — D48.9 NEOPLASM, UNCERTAIN WHETHER BENIGN OR MALIGNANT: ICD-10-CM

## 2023-11-06 DIAGNOSIS — J33.9 NASAL POLYP: ICD-10-CM

## 2023-11-06 DIAGNOSIS — I51.7 LVH (LEFT VENTRICULAR HYPERTROPHY): ICD-10-CM

## 2023-11-06 DIAGNOSIS — M54.50 CHRONIC BILATERAL LOW BACK PAIN WITHOUT SCIATICA: ICD-10-CM

## 2023-11-06 DIAGNOSIS — G89.29 CHRONIC BILATERAL LOW BACK PAIN WITHOUT SCIATICA: ICD-10-CM

## 2023-11-06 DIAGNOSIS — M79.10 MYALGIA: ICD-10-CM

## 2023-11-06 DIAGNOSIS — Z00.01 ENCOUNTER FOR GENERAL ADULT MEDICAL EXAMINATION WITH ABNORMAL FINDINGS: ICD-10-CM

## 2023-11-06 DIAGNOSIS — F41.9 ANXIETY: ICD-10-CM

## 2023-11-06 DIAGNOSIS — D22.39 MELANOCYTIC NEVUS OF FACE, OTHER LOCATION: ICD-10-CM

## 2023-11-06 DIAGNOSIS — G89.29 CHRONIC RIGHT SHOULDER PAIN: ICD-10-CM

## 2023-11-06 DIAGNOSIS — Z48.02 VISIT FOR SUTURE REMOVAL: ICD-10-CM

## 2023-11-06 DIAGNOSIS — Z20.822 SUSPECTED COVID-19 VIRUS INFECTION: ICD-10-CM

## 2023-11-06 DIAGNOSIS — M19.90 ARTHRITIS: ICD-10-CM

## 2023-11-06 DIAGNOSIS — I45.9 INTRAVENTRICULAR CONDUCTION DELAY: ICD-10-CM

## 2023-11-06 DIAGNOSIS — I10 ESSENTIAL HYPERTENSION: ICD-10-CM

## 2023-11-06 DIAGNOSIS — Z82.49 FAMILY HISTORY OF HEART DISEASE: ICD-10-CM

## 2023-11-06 DIAGNOSIS — E66.3 OVERWEIGHT: ICD-10-CM

## 2023-11-06 DIAGNOSIS — F40.00 AGORAPHOBIA: ICD-10-CM

## 2023-11-06 DIAGNOSIS — I49.8 SINUS ARRHYTHMIA: ICD-10-CM

## 2023-11-06 RX ORDER — ATORVASTATIN CALCIUM 20 MG/1
10 TABLET, FILM COATED ORAL DAILY
Qty: 90 TABLET | Refills: 1 | Status: SHIPPED | OUTPATIENT
Start: 2023-11-06 | End: 2023-11-09 | Stop reason: SDUPTHER

## 2023-11-06 NOTE — ASSESSMENT & PLAN NOTE
Doing well; Patient tolerated celexa well without side effects. I feel the benefits of the medication outweigh the risks.

## 2023-11-06 NOTE — ASSESSMENT & PLAN NOTE
Doing well; Encouraged to watch salt, exercise more and lose weight.  Patient tolerated lilsinopril well without side effects. I feel the benefits of the medication outweigh the risks.

## 2023-11-06 NOTE — ASSESSMENT & PLAN NOTE
Patient's (Body mass index is 31.22 kg/m².) indicates that they are overweight with health conditions that include dyslipidemias . Weight is unchanged. BMI is is above average; BMI management plan is completed. We discussed low calorie, low carb based diet program, portion control, and increasing exercise.

## 2023-11-06 NOTE — PROGRESS NOTES
Subjective   Mtat Moore is a 33 y.o. male here for his annual physical with me. Matt is here for coordination of medical care, to discuss health maintenance, disease prevention as well as to followup on medical problems. Patient has been followed by me since 2018 or longer . Patient's last CPE was 08/2022. Activity level is moderate. Exercises 5 per week. Appetite is good. Feels well with none complaints. Energy level is good. Sleeps well. Patient has never had a colonoscopy due to his age.   Patient is doing routine self skin exam monthly. . Patient is checking testicles monthly.        History of Present Illness    Left ventricular hypertrophy    Hyperlipidemia  This is a chronic problem. The current episode started more than 1 year ago. The problem is controlled. Pertinent negatives include no chest pain, myalgias or shortness of breath. Current antihyperlipidemic treatment includes statins.        The following portions of the patient's history were reviewed and updated as appropriate: allergies, current medications, past family history, past medical history, past social history, past surgical history, and problem list.    Past Medical History:   Diagnosis Date    Agoraphobia     Anxiety     HDL deficiency     Hyperlipidemia     Hypertension     Malaise and fatigue     Near syncope     Overweight     Skin abnormality        Family History   Problem Relation Age of Onset    Coronary artery disease Mother 52        and/or Hypertension; valve repair at 52 living at 55.    Stroke Mother 52    Hyperlipidemia Mother         lipid in future    Heart disease Father         A-Fib    Hypertension Father     Other Father         detatched retna bilateral    Anxiety disorder Brother     Coronary artery disease Maternal Uncle         and/or Hypertension    Hypertension Maternal Grandmother     Diabetes Maternal Grandmother     Hypertension Maternal Grandfather     Colon cancer Paternal Grandmother     Diabetes Paternal  Grandmother     Coronary artery disease Paternal Grandfather         and/or Hypertension       Social History     Socioeconomic History    Marital status: Single   Tobacco Use    Smoking status: Former     Packs/day: 1.00     Years: 3.00     Additional pack years: 0.00     Total pack years: 3.00     Types: Cigarettes     Start date: 2016     Quit date: 2019     Years since quittin.4     Passive exposure: Current    Smokeless tobacco: Current     Types: Chew    Tobacco comments:     Patient has smoked 1 cigarette weekly since    Vaping Use    Vaping Use: Never used   Substance and Sexual Activity    Alcohol use: Not Currently     Comment: Sober since     Drug use: Never    Sexual activity: Not Currently       Social History     Social History Narrative    Never , lives alone.  Works at [employer],  since  works 48 hours weekly, Recently switched positions at work and not as stressful.  Exercise cutting wood 1 x weekly for 4-6 hours.  Caffeine Tea 60 oz daily and 20 oz soda daily.  Always wears seatbelts.  Hobbies hunting, fishing, riding motorcycles, archery and hunting trips, play video games.        No past surgical history on file.    Current Outpatient Medications on File Prior to Visit   Medication Sig Dispense Refill    citalopram (CeleXA) 20 MG tablet TAKE 1 TABLET BY MOUTH EVERY DAY 90 tablet 0    lisinopril (PRINIVIL,ZESTRIL) 10 MG tablet TAKE 1 TABLET BY MOUTH EVERY DAY 90 tablet 0    meloxicam (MOBIC) 15 MG tablet Take 1 tablet by mouth Daily. (Patient not taking: Reported on 10/31/2023) 30 tablet 12     No current facility-administered medications on file prior to visit.       No Known Allergies    Review of Systems   Constitutional:  Negative for appetite change, chills, fatigue, fever, unexpected weight gain and unexpected weight loss.   HENT:  Negative for congestion, dental problem, ear discharge, ear pain, hearing loss, nosebleeds (1 x  "month), postnasal drip, rhinorrhea, sinus pressure, sneezing, sore throat, tinnitus and voice change.         Last Dental exam -Dr. Rodriguez-   Eyes:  Negative for blurred vision, double vision, pain, redness and visual disturbance.        Last Vision exam 4-2023, Dr. Martinez-Doing well   Respiratory:  Negative for cough, shortness of breath, wheezing and stridor.    Cardiovascular:  Negative for chest pain, palpitations and leg swelling.   Gastrointestinal:  Negative for abdominal pain, anal bleeding, blood in stool, constipation, diarrhea, nausea, rectal pain, vomiting, GERD and indigestion.        7 BM weekly   Endocrine: Negative for cold intolerance, heat intolerance, polydipsia, polyphagia and polyuria.   Genitourinary:  Negative for difficulty urinating, dysuria, frequency, hematuria and urgency.   Musculoskeletal:  Negative for arthralgias, back pain, joint swelling, myalgias, neck pain and neck stiffness.   Skin:  Negative for color change, dry skin and rash.   Neurological:  Negative for dizziness, syncope, speech difficulty, weakness, light-headedness, headache and memory problem.   Hematological:  Does not bruise/bleed easily.   Psychiatric/Behavioral:  Negative for decreased concentration, sleep disturbance, depressed mood and stress. The patient is not nervous/anxious.        Objective   Visit Vitals  /70 (BP Location: Right arm, Patient Position: Sitting, Cuff Size: Adult)   Pulse 83   Temp 97.7 °F (36.5 °C) (Temporal)   Resp 18   Ht 177.8 cm (70\")   Wt 98.7 kg (217 lb 9.6 oz)   SpO2 98%   BMI 31.22 kg/m²     Physical Exam  Constitutional:       General: He is not in acute distress.     Appearance: He is well-developed. He is not diaphoretic.   HENT:      Head: Normocephalic.      Right Ear: Hearing, tympanic membrane, ear canal and external ear normal.      Left Ear: Hearing, tympanic membrane, ear canal and external ear normal.      Nose: Nose normal.      Comments: Right nostril -Nasal " polyp     Mouth/Throat:      Lips: Pink.      Mouth: Mucous membranes are moist.      Pharynx: Oropharynx is clear. No oropharyngeal exudate.   Eyes:      General: Lids are normal. No scleral icterus.        Right eye: No discharge.         Left eye: No discharge.      Extraocular Movements: Extraocular movements intact.      Conjunctiva/sclera: Conjunctivae normal.      Pupils: Pupils are equal, round, and reactive to light.   Neck:      Trachea: Trachea normal.   Cardiovascular:      Rate and Rhythm: Normal rate and regular rhythm.      Pulses:           Dorsalis pedis pulses are 1+ on the right side and 1+ on the left side.        Posterior tibial pulses are 0 on the right side and 1+ on the left side.      Heart sounds: Normal heart sounds. No murmur heard.  Pulmonary:      Effort: Pulmonary effort is normal.      Breath sounds: Normal breath sounds. No wheezing.   Chest:      Chest wall: No tenderness.   Breasts:     Right: Normal. No swelling, bleeding, inverted nipple, mass, nipple discharge, skin change or tenderness.      Left: Normal. No swelling, bleeding, inverted nipple, mass, nipple discharge, skin change or tenderness.   Abdominal:      General: Bowel sounds are normal. There is no distension.      Palpations: Abdomen is soft. There is no mass.      Tenderness: There is no abdominal tenderness.      Hernia: No hernia is present.   Genitourinary:     Penis: Normal and circumcised. No tenderness.       Testes: Normal.      Comments: No prostate exam due to age.  Musculoskeletal:         General: No tenderness or deformity. Normal range of motion.      Cervical back: Full passive range of motion without pain, normal range of motion and neck supple.   Lymphadenopathy:      Cervical: No cervical adenopathy.   Skin:     General: Skin is warm and dry.      Findings: No erythema or rash.      Comments: Nevus scattered over the body   Neurological:      Mental Status: He is oriented to person, place, and time.       Cranial Nerves: No cranial nerve deficit.      Sensory: No sensory deficit.      Motor: No abnormal muscle tone.      Coordination: Coordination normal.      Deep Tendon Reflexes: Reflexes normal.   Psychiatric:         Behavior: Behavior normal.         Thought Content: Thought content normal.         Judgment: Judgment normal.         Assessment & Plan   Problem List Items Addressed This Visit          Medium    Agoraphobia    Current Assessment & Plan     Doing well; Patient tolerated celexa well without side effects. I feel the benefits of the medication outweigh the risks.           Anxiety    Current Assessment & Plan     Doing well; Patient tolerated celexa well without side effects. I feel the benefits of the medication outweigh the risks.         Essential hypertension    Current Assessment & Plan     Doing well; Encouraged to watch salt, exercise more and lose weight.  Patient tolerated lilsinopril well without side effects. I feel the benefits of the medication outweigh the risks.           Mixed hyperlipidemia - Primary    Current Assessment & Plan     Worsening  Increase Lipitor to 20mg daily.   Encouraged to watch fatty intake, exercise more, and lose weight.   compliant with medication Patient tolerated lipitor well without side effects. I feel the benefits of the medication outweigh the risks.    Is not getting adequate diet and exercise  Goals developed at last visit were not met   Follow up in 2-3  months  Care management needs are self-addressed. Would benefit from care management. Self-management abilities addressed and patient is capable of managing his own disease.           Relevant Orders    Lipid Panel With / Chol / HDL Ratio    Comprehensive Metabolic Panel       Low    RESOLVED: Arthritis    Melanocytic nevus    Overview     Right cheek -- he is to watch for changes         Current Assessment & Plan     Advised to watch skin for changes.             Unprioritized    RESOLVED: Chronic  bilateral low back pain    RESOLVED: Chronic right shoulder pain    Encounter for general adult medical examination with abnormal findings    Overview                Current Assessment & Plan     Encouraged to do self-breast exam, self-testicle exams, and self derm exams. Congratulated on using seat belts.  Encouraged to do annual physical exams.  Immunization status reviewed.           Family history of heart disease    Overview     Mother in her 50s and uncles in their 60s.  Patient strong encouraged to lower risk factors         Current Assessment & Plan     Advised patient to keep risk factors low and start an Aspirin 81mg daily.          Intraventricular conduction delay    Current Assessment & Plan     Last EKG done October 31, 2023 showed this was improved         LVH (left ventricular hypertrophy)    Overview     Mild  Echo done 1-29-21         Current Assessment & Plan     Stable- Will hold on repeating ECHO at this time         RESOLVED: Myalgia    Nasal polyp    Current Assessment & Plan     Intermittent and mild- Declines referral to ENT         RESOLVED: Neoplasm, uncertain whether benign or malignant    Overweight    Current Assessment & Plan     Patient's (Body mass index is 31.22 kg/m².) indicates that they are overweight with health conditions that include dyslipidemias . Weight is unchanged. BMI is is above average; BMI management plan is completed. We discussed low calorie, low carb based diet program, portion control, and increasing exercise.          Sinus arrhythmia    Overview     Found on EKG January 12, 2021 will follow conservatively present.         Current Assessment & Plan     Stable- EKG done 10-31-23         RESOLVED: Suspected COVID-19 virus infection    RESOLVED: Visit for suture removal            Encouraged to check his skin, testicles and breasts monthly. Reviewed exercising regularly, eating a balanced diet, and immunizations and if due, patient counselled to check with insurance  company for coverage;.Keep risk factors low and

## 2023-11-06 NOTE — ASSESSMENT & PLAN NOTE
Worsening  Increase Lipitor to 20mg daily.   Encouraged to watch fatty intake, exercise more, and lose weight.   compliant with medication Patient tolerated lipitor well without side effects. I feel the benefits of the medication outweigh the risks.    Is not getting adequate diet and exercise  Goals developed at last visit were not met   Follow up in 2-3  months  Care management needs are self-addressed. Would benefit from care management. Self-management abilities addressed and patient is capable of managing his own disease.

## 2023-11-09 DIAGNOSIS — E78.2 MIXED HYPERLIPIDEMIA: ICD-10-CM

## 2023-11-09 RX ORDER — ATORVASTATIN CALCIUM 10 MG/1
10 TABLET, FILM COATED ORAL DAILY
Qty: 90 TABLET | Refills: 0 | Status: SHIPPED | OUTPATIENT
Start: 2023-11-09

## 2024-01-19 DIAGNOSIS — I10 ESSENTIAL HYPERTENSION: ICD-10-CM

## 2024-01-19 DIAGNOSIS — F32.89 OTHER DEPRESSION: ICD-10-CM

## 2024-01-22 RX ORDER — CITALOPRAM 20 MG/1
20 TABLET ORAL DAILY
Qty: 30 TABLET | Refills: 0 | Status: SHIPPED | OUTPATIENT
Start: 2024-01-22

## 2024-01-22 RX ORDER — LISINOPRIL 10 MG/1
10 TABLET ORAL DAILY
Qty: 30 TABLET | Refills: 0 | Status: SHIPPED | OUTPATIENT
Start: 2024-01-22

## 2024-02-21 DIAGNOSIS — I10 ESSENTIAL HYPERTENSION: ICD-10-CM

## 2024-02-21 DIAGNOSIS — F32.89 OTHER DEPRESSION: ICD-10-CM

## 2024-02-21 RX ORDER — LISINOPRIL 10 MG/1
10 TABLET ORAL DAILY
Qty: 30 TABLET | Refills: 0 | Status: SHIPPED | OUTPATIENT
Start: 2024-02-21

## 2024-02-21 RX ORDER — CITALOPRAM 20 MG/1
20 TABLET ORAL DAILY
Qty: 30 TABLET | Refills: 0 | Status: SHIPPED | OUTPATIENT
Start: 2024-02-21

## 2024-03-05 LAB
ALBUMIN SERPL-MCNC: 4.7 G/DL (ref 4.1–5.1)
ALBUMIN/GLOB SERPL: 1.7 {RATIO} (ref 1.2–2.2)
ALP SERPL-CCNC: 77 IU/L (ref 44–121)
ALT SERPL-CCNC: 34 IU/L (ref 0–44)
AST SERPL-CCNC: 23 IU/L (ref 0–40)
BILIRUB SERPL-MCNC: 0.5 MG/DL (ref 0–1.2)
BUN SERPL-MCNC: 18 MG/DL (ref 6–20)
BUN/CREAT SERPL: 21 (ref 9–20)
CALCIUM SERPL-MCNC: 9.8 MG/DL (ref 8.7–10.2)
CHLORIDE SERPL-SCNC: 101 MMOL/L (ref 96–106)
CHOLEST SERPL-MCNC: 118 MG/DL (ref 100–199)
CHOLEST/HDLC SERPL: 4.1 RATIO (ref 0–5)
CO2 SERPL-SCNC: 24 MMOL/L (ref 20–29)
CREAT SERPL-MCNC: 0.87 MG/DL (ref 0.76–1.27)
EGFRCR SERPLBLD CKD-EPI 2021: 118 ML/MIN/1.73
GLOBULIN SER CALC-MCNC: 2.7 G/DL (ref 1.5–4.5)
GLUCOSE SERPL-MCNC: 91 MG/DL (ref 70–99)
HDLC SERPL-MCNC: 29 MG/DL
LDLC SERPL CALC-MCNC: 63 MG/DL (ref 0–99)
POTASSIUM SERPL-SCNC: 4.5 MMOL/L (ref 3.5–5.2)
PROT SERPL-MCNC: 7.4 G/DL (ref 6–8.5)
SODIUM SERPL-SCNC: 139 MMOL/L (ref 134–144)
TRIGL SERPL-MCNC: 151 MG/DL (ref 0–149)
VLDLC SERPL CALC-MCNC: 26 MG/DL (ref 5–40)

## 2024-03-06 ENCOUNTER — OFFICE VISIT (OUTPATIENT)
Dept: FAMILY MEDICINE CLINIC | Facility: CLINIC | Age: 34
End: 2024-03-06
Payer: COMMERCIAL

## 2024-03-06 VITALS
TEMPERATURE: 97.8 F | HEIGHT: 70 IN | OXYGEN SATURATION: 97 % | HEART RATE: 72 BPM | RESPIRATION RATE: 18 BRPM | SYSTOLIC BLOOD PRESSURE: 126 MMHG | BODY MASS INDEX: 31.24 KG/M2 | WEIGHT: 218.2 LBS | DIASTOLIC BLOOD PRESSURE: 80 MMHG

## 2024-03-06 DIAGNOSIS — E78.2 MIXED HYPERLIPIDEMIA: ICD-10-CM

## 2024-03-06 DIAGNOSIS — I10 ESSENTIAL HYPERTENSION: Primary | ICD-10-CM

## 2024-03-06 DIAGNOSIS — E66.3 OVERWEIGHT: ICD-10-CM

## 2024-03-06 RX ORDER — LISINOPRIL 10 MG/1
10 TABLET ORAL DAILY
Qty: 90 TABLET | Refills: 2 | Status: SHIPPED | OUTPATIENT
Start: 2024-03-06

## 2024-03-06 RX ORDER — ATORVASTATIN CALCIUM 20 MG/1
20 TABLET, FILM COATED ORAL DAILY
Qty: 90 TABLET | Refills: 2 | Status: SHIPPED | OUTPATIENT
Start: 2024-03-06

## 2024-03-06 NOTE — ASSESSMENT & PLAN NOTE
Lipid and CMP done 3-4-2024, read by me, reviewed with pt.  Trig. 151 down from 158, Tot. Chol. 118 down from 141, HDL 29 down from 33, LDL 63 down from 80  Improved.  Close to goal.  Encouraged to watch fatty intake, exercise more, and lose weight.   Compliant with medication.Patient tolerated Lipitor well without side effects. I feel the benefits of the medication outweigh the risks.    Is not getting adequate diet and exercise  Goals developed at last visit were not met   Follow up in 4  months  Care management needs are self-addressed. . Self-management abilities addressed and patient is capable of managing his own disease.

## 2024-03-06 NOTE — PROGRESS NOTES
Subjective   Matt Moore is a 34 y.o. male.     Hyperlipidemia  Pertinent negatives include no chest pain, myalgias or shortness of breath.   Hypertension  Pertinent negatives include no chest pain, palpitations or shortness of breath.        The following portions of the patient's history were reviewed and updated as appropriate: allergies, current medications, past family history, past medical history, past social history, past surgical history, and problem list.    Family History   Problem Relation Age of Onset    Coronary artery disease Mother 52        and/or Hypertension; valve repair at 52 living at 55.    Stroke Mother 52    Hyperlipidemia Mother         lipid in future    Heart disease Father         A-Fib    Hypertension Father     Other Father         detatched retna bilateral    Anxiety disorder Brother     Coronary artery disease Maternal Uncle         and/or Hypertension    Hypertension Maternal Grandmother     Diabetes Maternal Grandmother     Hypertension Maternal Grandfather     Colon cancer Paternal Grandmother     Diabetes Paternal Grandmother     Coronary artery disease Paternal Grandfather         and/or Hypertension       Social History     Tobacco Use    Smoking status: Former     Current packs/day: 0.00     Average packs/day: 1 pack/day for 3.2 years (3.2 ttl pk-yrs)     Types: Cigarettes     Start date: 2016     Quit date: 2019     Years since quittin.7     Passive exposure: Current    Smokeless tobacco: Current     Types: Chew    Tobacco comments:     Patient has smoked 1 cigarette weekly since    Vaping Use    Vaping status: Never Used   Substance Use Topics    Alcohol use: Not Currently     Comment: Sober since     Drug use: Never       History reviewed. No pertinent surgical history.    Patient Active Problem List   Diagnosis    Agoraphobia    Anxiety    Essential hypertension    Mixed hyperlipidemia    Encounter for general adult medical examination with  "abnormal findings    Sinus arrhythmia    Intraventricular conduction delay    Family history of heart disease    Overweight    Nasal polyp    Melanocytic nevus    LVH (left ventricular hypertrophy)       Current Outpatient Medications on File Prior to Visit   Medication Sig Dispense Refill    citalopram (CeleXA) 20 MG tablet TAKE 1 TABLET BY MOUTH EVERY DAY 30 tablet 0     No current facility-administered medications on file prior to visit.       No Known Allergies    Review of Systems   Constitutional:  Negative for fatigue, unexpected weight gain and unexpected weight loss.   Respiratory:  Negative for shortness of breath.    Cardiovascular:  Negative for chest pain, palpitations and leg swelling.   Gastrointestinal:  Negative for nausea.   Musculoskeletal:  Negative for myalgias.   Skin:  Negative for dry skin.   Neurological:  Negative for headache.       Objective   Visit Vitals  /80 (BP Location: Left arm, Patient Position: Sitting, Cuff Size: Adult)   Pulse 72   Temp 97.8 °F (36.6 °C) (Temporal)   Resp 18   Ht 177.8 cm (70\")   Wt 99 kg (218 lb 3.2 oz)   SpO2 97%   BMI 31.31 kg/m²     Physical Exam  Vitals and nursing note reviewed.   Constitutional:       Appearance: He is well-developed.   HENT:      Head: Normocephalic.   Neck:      Thyroid: No thyromegaly.      Vascular: No carotid bruit.      Trachea: Trachea normal.   Cardiovascular:      Rate and Rhythm: Normal rate and regular rhythm.      Heart sounds: No murmur heard.     No friction rub. No gallop.   Pulmonary:      Effort: Pulmonary effort is normal. No respiratory distress.      Breath sounds: Normal breath sounds. No wheezing.   Chest:      Chest wall: No tenderness.   Musculoskeletal:      Cervical back: Neck supple.   Skin:     General: Skin is dry.      Findings: No rash.      Nails: There is no clubbing.   Neurological:      Mental Status: He is alert and oriented to person, place, and time.   Psychiatric:         Behavior: Behavior is " cooperative.           Assessment & Plan .  Problem List Items Addressed This Visit          Medium    Essential hypertension - Primary    Current Assessment & Plan     Doing well.  Patient tolerated Lisinopril well without side effects. I feel the benefits of the medication outweigh the risks.          Relevant Medications    lisinopril (PRINIVIL,ZESTRIL) 10 MG tablet    Mixed hyperlipidemia    Current Assessment & Plan      Lipid and CMP done 3-4-2024, read by me, reviewed with pt.  Trig. 151 down from 158, Tot. Chol. 118 down from 141, HDL 29 down from 33, LDL 63 down from 80  Improved.  Close to goal.  Encouraged to watch fatty intake, exercise more, and lose weight.   Compliant with medication.Patient tolerated Lipitor well without side effects. I feel the benefits of the medication outweigh the risks.    Is not getting adequate diet and exercise  Goals developed at last visit were not met   Follow up in 4  months  Care management needs are self-addressed. . Self-management abilities addressed and patient is capable of managing his own disease.           Relevant Medications    atorvastatin (LIPITOR) 20 MG tablet    Other Relevant Orders    Lipid Panel With / Chol / HDL Ratio    Comprehensive Metabolic Panel       Unprioritized    Overweight    Current Assessment & Plan     Patient's (Body mass index is 31.31 kg/m².) indicates that they are overweight with health conditions that include hypertension and dyslipidemias . Weight is worsening. BMI is is above average; BMI management plan is completed. We discussed portion control and increasing exercise.

## 2024-03-06 NOTE — ASSESSMENT & PLAN NOTE
Doing well.  --Patient tolerated Lisinopril well without side effects. I feel the benefits of the medication outweigh the risks.

## 2024-03-06 NOTE — ASSESSMENT & PLAN NOTE
Patient's (Body mass index is 31.31 kg/m².) indicates that they are overweight with health conditions that include hypertension and dyslipidemias . Weight is worsening. BMI is is above average; BMI management plan is completed. We discussed portion control and increasing exercise.

## 2024-03-10 DIAGNOSIS — E78.2 MIXED HYPERLIPIDEMIA: ICD-10-CM

## 2024-03-11 RX ORDER — ATORVASTATIN CALCIUM 20 MG/1
20 TABLET, FILM COATED ORAL DAILY
Qty: 90 TABLET | Refills: 1 | Status: SHIPPED | OUTPATIENT
Start: 2024-03-11

## 2024-03-23 DIAGNOSIS — F32.89 OTHER DEPRESSION: ICD-10-CM

## 2024-03-25 RX ORDER — CITALOPRAM 20 MG/1
20 TABLET ORAL DAILY
Qty: 30 TABLET | Refills: 0 | Status: SHIPPED | OUTPATIENT
Start: 2024-03-25

## 2024-04-29 DIAGNOSIS — F32.89 OTHER DEPRESSION: ICD-10-CM

## 2024-04-29 RX ORDER — CITALOPRAM 20 MG/1
20 TABLET ORAL DAILY
Qty: 30 TABLET | Refills: 2 | Status: SHIPPED | OUTPATIENT
Start: 2024-04-29

## 2024-08-13 LAB
ALBUMIN SERPL-MCNC: 4.6 G/DL (ref 4.1–5.1)
ALP SERPL-CCNC: 76 IU/L (ref 44–121)
ALT SERPL-CCNC: 32 IU/L (ref 0–44)
AST SERPL-CCNC: 26 IU/L (ref 0–40)
BILIRUB SERPL-MCNC: 0.2 MG/DL (ref 0–1.2)
BUN SERPL-MCNC: 18 MG/DL (ref 6–20)
BUN/CREAT SERPL: 19 (ref 9–20)
CALCIUM SERPL-MCNC: 9.8 MG/DL (ref 8.7–10.2)
CHLORIDE SERPL-SCNC: 101 MMOL/L (ref 96–106)
CHOLEST SERPL-MCNC: 123 MG/DL (ref 100–199)
CHOLEST/HDLC SERPL: 4.6 RATIO (ref 0–5)
CO2 SERPL-SCNC: 24 MMOL/L (ref 20–29)
CREAT SERPL-MCNC: 0.94 MG/DL (ref 0.76–1.27)
EGFRCR SERPLBLD CKD-EPI 2021: 110 ML/MIN/1.73
GLOBULIN SER CALC-MCNC: 2.6 G/DL (ref 1.5–4.5)
GLUCOSE SERPL-MCNC: 83 MG/DL (ref 70–99)
HDLC SERPL-MCNC: 27 MG/DL
LDLC SERPL CALC-MCNC: 58 MG/DL (ref 0–99)
POTASSIUM SERPL-SCNC: 4.2 MMOL/L (ref 3.5–5.2)
PROT SERPL-MCNC: 7.2 G/DL (ref 6–8.5)
SODIUM SERPL-SCNC: 140 MMOL/L (ref 134–144)
TRIGL SERPL-MCNC: 232 MG/DL (ref 0–149)
VLDLC SERPL CALC-MCNC: 38 MG/DL (ref 5–40)

## 2024-08-14 ENCOUNTER — OFFICE VISIT (OUTPATIENT)
Dept: FAMILY MEDICINE CLINIC | Facility: CLINIC | Age: 34
End: 2024-08-14
Payer: COMMERCIAL

## 2024-08-14 VITALS
OXYGEN SATURATION: 98 % | RESPIRATION RATE: 15 BRPM | BODY MASS INDEX: 31.32 KG/M2 | HEART RATE: 86 BPM | HEIGHT: 70 IN | WEIGHT: 218.8 LBS | TEMPERATURE: 98 F | SYSTOLIC BLOOD PRESSURE: 137 MMHG | DIASTOLIC BLOOD PRESSURE: 85 MMHG

## 2024-08-14 DIAGNOSIS — E78.2 MIXED HYPERLIPIDEMIA: ICD-10-CM

## 2024-08-14 DIAGNOSIS — I10 ESSENTIAL HYPERTENSION: Primary | ICD-10-CM

## 2024-08-14 DIAGNOSIS — E66.3 OVERWEIGHT: ICD-10-CM

## 2024-08-14 PROCEDURE — 99214 OFFICE O/P EST MOD 30 MIN: CPT | Performed by: FAMILY MEDICINE

## 2024-08-14 NOTE — ASSESSMENT & PLAN NOTE
--Slightly Worsening-close to goal  Encouraged to watch fatty intake, exercise more, and lose weight. compliant with medication  Patient tolerated lipitor well without side effects. I feel the benefits of the medication outweigh the risks.    Is not getting adequate diet and exercise  Goals developed at last visit were not met   Follow up in  4 months  Care management needs are self-addressed. . Self-management abilities addressed and patient is capable of managing his own disease.

## 2024-08-14 NOTE — PROGRESS NOTES
Subjective   Matt Moore is a 34 y.o. male.     Hypertension  This is a chronic problem. The current episode started more than 1 year ago. The problem has been improved since onset. The problem is controlled. Pertinent negatives include no chest pain, palpitations or shortness of breath.   Hyperlipidemia  This is a chronic problem. The current episode started more than 1 year ago. The problem is controlled. Pertinent negatives include no chest pain, myalgias or shortness of breath. Current antihyperlipidemic treatment includes statins.        The following portions of the patient's history were reviewed and updated as appropriate: allergies, current medications, past family history, past medical history, past social history, past surgical history, and problem list.    Family History   Problem Relation Age of Onset    Coronary artery disease Mother 52        and/or Hypertension; valve repair at 52 living at 55.    Stroke Mother 52    Hyperlipidemia Mother         lipid in future    Heart disease Father         A-Fib    Hypertension Father     Other Father         detatched retna bilateral    Anxiety disorder Brother     Coronary artery disease Maternal Uncle         and/or Hypertension    Hypertension Maternal Grandmother     Diabetes Maternal Grandmother     Hypertension Maternal Grandfather     Colon cancer Paternal Grandmother     Diabetes Paternal Grandmother     Coronary artery disease Paternal Grandfather         and/or Hypertension       Social History     Tobacco Use    Smoking status: Former     Current packs/day: 0.00     Average packs/day: 1 pack/day for 3.2 years (3.2 ttl pk-yrs)     Types: Cigarettes     Start date: 2016     Quit date: 2019     Years since quittin.1     Passive exposure: Current    Smokeless tobacco: Current     Types: Chew    Tobacco comments:     Patient has smoked 1 cigarette weekly since    Vaping Use    Vaping status: Never Used   Substance Use Topics    Alcohol  "use: Not Currently     Comment: Sober since 9-2020    Drug use: Never       No past surgical history on file.    Patient Active Problem List   Diagnosis    Agoraphobia    Anxiety    Essential hypertension    Mixed hyperlipidemia    Encounter for general adult medical examination with abnormal findings    Sinus arrhythmia    Intraventricular conduction delay    Family history of heart disease    Overweight    Nasal polyp    Melanocytic nevus    LVH (left ventricular hypertrophy)       Current Outpatient Medications on File Prior to Visit   Medication Sig Dispense Refill    atorvastatin (LIPITOR) 20 MG tablet Take 1 tablet by mouth Daily. Take 1 tablet daily. 90 tablet 2    atorvastatin (LIPITOR) 20 MG tablet Take 1 tablet by mouth Daily. Take 1 tablet daily. 90 tablet 1    citalopram (CeleXA) 20 MG tablet TAKE 1 TABLET BY MOUTH EVERY DAY 30 tablet 2    lisinopril (PRINIVIL,ZESTRIL) 10 MG tablet Take 1 tablet by mouth Daily. 90 tablet 2     No current facility-administered medications on file prior to visit.       No Known Allergies    Review of Systems   Constitutional:  Negative for fatigue, unexpected weight gain and unexpected weight loss.   Respiratory:  Negative for shortness of breath.    Cardiovascular:  Negative for chest pain, palpitations and leg swelling.   Gastrointestinal:  Negative for nausea.   Musculoskeletal:  Negative for myalgias.   Skin:  Negative for dry skin.   Neurological:  Negative for headache.       Objective   Visit Vitals  /85 (BP Location: Left arm, Patient Position: Sitting, Cuff Size: Large Adult)   Pulse 86   Temp 98 °F (36.7 °C)   Resp 15   Ht 177.8 cm (70\")   Wt 99.2 kg (218 lb 12.8 oz)   SpO2 98%   BMI 31.39 kg/m²     Physical Exam  Vitals and nursing note reviewed.   Constitutional:       Appearance: He is well-developed.   HENT:      Head: Normocephalic.   Neck:      Thyroid: No thyromegaly.      Vascular: No carotid bruit.      Trachea: Trachea normal.   Cardiovascular:    "   Rate and Rhythm: Normal rate and regular rhythm.      Heart sounds: No murmur heard.     No friction rub. No gallop.   Pulmonary:      Effort: Pulmonary effort is normal. No respiratory distress.      Breath sounds: Normal breath sounds. No wheezing.   Chest:      Chest wall: No tenderness.   Musculoskeletal:      Cervical back: Neck supple.   Skin:     General: Skin is dry.      Findings: No rash.      Nails: There is no clubbing.   Neurological:      Mental Status: He is alert and oriented to person, place, and time.   Psychiatric:         Behavior: Behavior is cooperative.           Assessment & Plan .  Problem List Items Addressed This Visit          Medium    Essential hypertension - Primary    Current Assessment & Plan     Doing well; Encouraged to watch salt, exercise more and lose weight.  Patient tolerated lisinopril well without side effects. I feel the benefits of the medication outweigh the risks.           Mixed hyperlipidemia    Current Assessment & Plan     --Slightly Worsening-close to goal  Encouraged to watch fatty intake, exercise more, and lose weight. compliant with medication  Patient tolerated lipitor well without side effects. I feel the benefits of the medication outweigh the risks.    Is not getting adequate diet and exercise  Goals developed at last visit were not met   Follow up in  4 months  Care management needs are self-addressed. . Self-management abilities addressed and patient is capable of managing his own disease.              Unprioritized    Overweight

## 2024-08-16 DIAGNOSIS — F32.89 OTHER DEPRESSION: ICD-10-CM

## 2024-08-19 RX ORDER — CITALOPRAM 20 MG/1
20 TABLET ORAL DAILY
Qty: 30 TABLET | Refills: 3 | Status: SHIPPED | OUTPATIENT
Start: 2024-08-19

## 2024-12-14 DIAGNOSIS — E78.2 MIXED HYPERLIPIDEMIA: ICD-10-CM

## 2024-12-16 RX ORDER — ATORVASTATIN CALCIUM 20 MG/1
20 TABLET, FILM COATED ORAL DAILY
Qty: 90 TABLET | Refills: 0 | Status: SHIPPED | OUTPATIENT
Start: 2024-12-16

## 2025-01-13 ENCOUNTER — OFFICE VISIT (OUTPATIENT)
Dept: FAMILY MEDICINE CLINIC | Facility: CLINIC | Age: 35
End: 2025-01-13
Payer: COMMERCIAL

## 2025-01-13 VITALS
BODY MASS INDEX: 30.92 KG/M2 | DIASTOLIC BLOOD PRESSURE: 78 MMHG | WEIGHT: 216 LBS | HEIGHT: 70 IN | RESPIRATION RATE: 14 BRPM | SYSTOLIC BLOOD PRESSURE: 124 MMHG | TEMPERATURE: 97.1 F | HEART RATE: 88 BPM | OXYGEN SATURATION: 95 %

## 2025-01-13 DIAGNOSIS — R53.83 LETHARGY: ICD-10-CM

## 2025-01-13 DIAGNOSIS — I51.7 LVH (LEFT VENTRICULAR HYPERTROPHY): ICD-10-CM

## 2025-01-13 DIAGNOSIS — E66.3 OVERWEIGHT: ICD-10-CM

## 2025-01-13 DIAGNOSIS — F41.9 ANXIETY: ICD-10-CM

## 2025-01-13 DIAGNOSIS — I10 ESSENTIAL HYPERTENSION: Primary | ICD-10-CM

## 2025-01-13 DIAGNOSIS — E78.2 MIXED HYPERLIPIDEMIA: ICD-10-CM

## 2025-01-13 NOTE — PROGRESS NOTES
Anders Moore is a 35 y.o. male.   No chief complaint on file.    History of Present Illness    Left ventricular hypertrophy    Hypertension  This is a chronic problem. The current episode started more than 1 year ago. The problem has been improved since onset. The problem is controlled. Associated symptoms include anxiety. Pertinent negatives include no chest pain, palpitations or shortness of breath.   Anxiety  Presents for follow-up visit.  Patient reports no chest pain, depressed mood, nervous/anxious behavior, palpitations or shortness of breath. Symptoms occur rarely. The quality of sleep is good.        The following portions of the patient's history were reviewed and updated as appropriate: allergies, current medications, past family history, past medical history, past social history, past surgical history, and problem list.    Past Medical History:   Diagnosis Date    Agoraphobia     Anxiety     HDL deficiency     Hyperlipidemia     Hypertension     Malaise and fatigue     Near syncope     Overweight     Skin abnormality        No past surgical history on file.     Family History   Problem Relation Age of Onset    Coronary artery disease Mother 52        and/or Hypertension; valve repair at 52 living at 55.    Stroke Mother 52    Hyperlipidemia Mother         lipid in future    Heart disease Father         A-Fib    Hypertension Father     Other Father         detatched retna bilateral    Anxiety disorder Brother     Coronary artery disease Maternal Uncle         and/or Hypertension    Hypertension Maternal Grandmother     Diabetes Maternal Grandmother     Hypertension Maternal Grandfather     Colon cancer Paternal Grandmother     Diabetes Paternal Grandmother     Coronary artery disease Paternal Grandfather         and/or Hypertension        Social History     Socioeconomic History    Marital status: Single   Tobacco Use    Smoking status: Former     Current packs/day: 0.00     Average  "packs/day: 1 pack/day for 3.2 years (3.2 ttl pk-yrs)     Types: Cigarettes     Start date: 2016     Quit date: 2019     Years since quittin.5     Passive exposure: Current    Smokeless tobacco: Current     Types: Chew    Tobacco comments:     Patient has smoked 1 cigarette weekly since    Vaping Use    Vaping status: Never Used   Substance and Sexual Activity    Alcohol use: Not Currently     Comment: Sober since     Drug use: Never    Sexual activity: Not Currently       Outpatient Medications Prior to Visit   Medication Sig Dispense Refill    atorvastatin (LIPITOR) 20 MG tablet TAKE 1 TABLET BY MOUTH EVERY DAY 90 tablet 0    citalopram (CeleXA) 20 MG tablet TAKE 1 TABLET BY MOUTH EVERY DAY 30 tablet 3    lisinopril (PRINIVIL,ZESTRIL) 10 MG tablet Take 1 tablet by mouth Daily. 90 tablet 2     No facility-administered medications prior to visit.        Review of Systems   Constitutional:  Positive for fatigue (mild).   HENT:  Negative for hearing loss.    Respiratory:  Negative for shortness of breath.    Cardiovascular:  Negative for chest pain and palpitations.   Genitourinary:  Negative for frequency.   Musculoskeletal:  Negative for joint swelling.   Neurological:  Negative for memory problem.   Psychiatric/Behavioral:  Negative for depressed mood. The patient is not nervous/anxious.        Objective   Visit Vitals  /78 (BP Location: Left arm, Patient Position: Sitting, Cuff Size: Large Adult)   Pulse 88   Temp 97.1 °F (36.2 °C)   Resp 14   Ht 177.8 cm (70\")   Wt 98 kg (216 lb)   SpO2 95%   BMI 30.99 kg/m²     Physical Exam  Vitals and nursing note reviewed.   Constitutional:       Appearance: He is well-developed.   HENT:      Head: Normocephalic.   Neck:      Thyroid: No thyromegaly.      Vascular: No carotid bruit.      Trachea: Trachea normal.   Cardiovascular:      Rate and Rhythm: Normal rate and regular rhythm.      Heart sounds: No murmur heard.     No friction rub. No " gallop.   Pulmonary:      Effort: Pulmonary effort is normal. No respiratory distress.      Breath sounds: Normal breath sounds. No wheezing.   Chest:      Chest wall: No tenderness.   Musculoskeletal:      Cervical back: Neck supple.   Skin:     General: Skin is dry.      Findings: No rash.      Nails: There is no clubbing.   Neurological:      Mental Status: He is alert and oriented to person, place, and time.   Psychiatric:         Behavior: Behavior is cooperative.         Assessment & Plan   Problem List Items Addressed This Visit          Medium    Anxiety    Current Assessment & Plan     Doing well; Patient tolerated celexa well without side effects. I feel the benefits of the medication outweigh the risks. Discussed changing dose of medication-patient feels like it is the right dose for him.          Essential hypertension - Primary    Current Assessment & Plan     Doing well; Encouraged to watch salt, exercise more and lose weight.  Patient tolerated lisinopril well without side effects. I feel the benefits of the medication outweigh the risks.  EKG done today and read by myself shows normal sinus rhythm with ventricular rate of 87 with LVH doubt ischemic-Minimal changes from 10-31-23         Relevant Orders    ECG 12 Lead    Mixed hyperlipidemia    Current Assessment & Plan      Will order labs today and patient will return for results and shared decision making.           Relevant Orders    Comprehensive Metabolic Panel (Completed)    Lipid Panel (Completed)       Unprioritized    Lethargy    Current Assessment & Plan     Will order labs today and patient will return for results and shared decision making.           Relevant Orders    CBC & Differential (Completed)    TSH (Completed)    LVH (left ventricular hypertrophy)    Overview     Mild  Echo done 1-29-21         Current Assessment & Plan     Discussed repeating echo-patient declines at this time.          Overweight    Current Assessment & Plan      Patient encouraged to increase activity and decrease caloric intake

## 2025-01-13 NOTE — ASSESSMENT & PLAN NOTE
Doing well; Encouraged to watch salt, exercise more and lose weight.  Patient tolerated lisinopril well without side effects. I feel the benefits of the medication outweigh the risks.  EKG done today and read by myself shows normal sinus rhythm with ventricular rate of 87 with LVH doubt ischemic-Minimal changes from 10-31-23

## 2025-01-13 NOTE — ASSESSMENT & PLAN NOTE
Doing well; Patient tolerated celexa well without side effects. I feel the benefits of the medication outweigh the risks. Discussed changing dose of medication-patient feels like it is the right dose for him.

## 2025-01-18 LAB
ALBUMIN SERPL-MCNC: 4.9 G/DL (ref 4.1–5.1)
ALP SERPL-CCNC: 80 IU/L (ref 44–121)
ALT SERPL-CCNC: 31 IU/L (ref 0–44)
AST SERPL-CCNC: 27 IU/L (ref 0–40)
BASOPHILS # BLD AUTO: 0 X10E3/UL (ref 0–0.2)
BASOPHILS NFR BLD AUTO: 0 %
BILIRUB SERPL-MCNC: 0.6 MG/DL (ref 0–1.2)
BUN SERPL-MCNC: 12 MG/DL (ref 6–20)
BUN/CREAT SERPL: 12 (ref 9–20)
CALCIUM SERPL-MCNC: 10.1 MG/DL (ref 8.7–10.2)
CHLORIDE SERPL-SCNC: 101 MMOL/L (ref 96–106)
CHOLEST SERPL-MCNC: 112 MG/DL (ref 100–199)
CO2 SERPL-SCNC: 24 MMOL/L (ref 20–29)
CREAT SERPL-MCNC: 1.01 MG/DL (ref 0.76–1.27)
EGFRCR SERPLBLD CKD-EPI 2021: 101 ML/MIN/1.73
EOSINOPHIL # BLD AUTO: 0.1 X10E3/UL (ref 0–0.4)
EOSINOPHIL NFR BLD AUTO: 1 %
ERYTHROCYTE [DISTWIDTH] IN BLOOD BY AUTOMATED COUNT: 12 % (ref 11.6–15.4)
GLOBULIN SER CALC-MCNC: 2.7 G/DL (ref 1.5–4.5)
GLUCOSE SERPL-MCNC: 91 MG/DL (ref 70–99)
HCT VFR BLD AUTO: 46 % (ref 37.5–51)
HDLC SERPL-MCNC: 28 MG/DL
HGB BLD-MCNC: 15.1 G/DL (ref 13–17.7)
IMM GRANULOCYTES # BLD AUTO: 0 X10E3/UL (ref 0–0.1)
IMM GRANULOCYTES NFR BLD AUTO: 0 %
LDLC SERPL CALC-MCNC: 62 MG/DL (ref 0–99)
LYMPHOCYTES # BLD AUTO: 1.9 X10E3/UL (ref 0.7–3.1)
LYMPHOCYTES NFR BLD AUTO: 24 %
MCH RBC QN AUTO: 28 PG (ref 26.6–33)
MCHC RBC AUTO-ENTMCNC: 32.8 G/DL (ref 31.5–35.7)
MCV RBC AUTO: 85 FL (ref 79–97)
MONOCYTES # BLD AUTO: 0.6 X10E3/UL (ref 0.1–0.9)
MONOCYTES NFR BLD AUTO: 8 %
NEUTROPHILS # BLD AUTO: 5.1 X10E3/UL (ref 1.4–7)
NEUTROPHILS NFR BLD AUTO: 67 %
PLATELET # BLD AUTO: 339 X10E3/UL (ref 150–450)
POTASSIUM SERPL-SCNC: 4.2 MMOL/L (ref 3.5–5.2)
PROT SERPL-MCNC: 7.6 G/DL (ref 6–8.5)
RBC # BLD AUTO: 5.4 X10E6/UL (ref 4.14–5.8)
SODIUM SERPL-SCNC: 141 MMOL/L (ref 134–144)
TRIGL SERPL-MCNC: 120 MG/DL (ref 0–149)
TSH SERPL DL<=0.005 MIU/L-ACNC: 1.07 UIU/ML (ref 0.45–4.5)
VLDLC SERPL CALC-MCNC: 22 MG/DL (ref 5–40)
WBC # BLD AUTO: 7.7 X10E3/UL (ref 3.4–10.8)

## 2025-01-20 NOTE — ASSESSMENT & PLAN NOTE
Mild.  Normal actively level thus delete.  CBC and TSH done 1-, read by me, reviewed with pt.  TSH was 1.070 up from 0.772, CBC was normal

## 2025-01-20 NOTE — ASSESSMENT & PLAN NOTE
Lipid and CMP done 1-, read by me, reviewed with pt.  Trig. 120 down from 232, Tot. Chol. 112 down from 123, HDL 28 up from 27, LDL 62 up from 58  Improved and close to goal  Encouraged to watch fatty intake, exercise more, and lose weight.   Compliant with medication.  Patient tolerated Lipitor well without side effects. I feel the benefits of the medication outweigh the risks.    Is not getting adequate diet and exercise  Goals developed at last visit were not met close to goal except for HDL  Follow up in 6  months  Care management needs are self-addressed. Self-management abilities addressed and patient is capable of managing his own disease.

## 2025-01-21 ENCOUNTER — OFFICE VISIT (OUTPATIENT)
Dept: FAMILY MEDICINE CLINIC | Facility: CLINIC | Age: 35
End: 2025-01-21
Payer: COMMERCIAL

## 2025-01-21 VITALS
HEIGHT: 70 IN | TEMPERATURE: 97.1 F | HEART RATE: 97 BPM | SYSTOLIC BLOOD PRESSURE: 136 MMHG | OXYGEN SATURATION: 97 % | WEIGHT: 215.9 LBS | RESPIRATION RATE: 18 BRPM | DIASTOLIC BLOOD PRESSURE: 86 MMHG | BODY MASS INDEX: 30.91 KG/M2

## 2025-01-21 DIAGNOSIS — I45.9 INTRAVENTRICULAR CONDUCTION DELAY: ICD-10-CM

## 2025-01-21 DIAGNOSIS — Z71.85 IMMUNIZATION COUNSELING: ICD-10-CM

## 2025-01-21 DIAGNOSIS — D22.39 MELANOCYTIC NEVUS OF FACE, OTHER LOCATION: ICD-10-CM

## 2025-01-21 DIAGNOSIS — J33.9 NASAL POLYP: ICD-10-CM

## 2025-01-21 DIAGNOSIS — I49.8 SINUS ARRHYTHMIA: ICD-10-CM

## 2025-01-21 DIAGNOSIS — F52.32 ANORGASMIA OF MALE: ICD-10-CM

## 2025-01-21 DIAGNOSIS — R53.83 LETHARGY: ICD-10-CM

## 2025-01-21 DIAGNOSIS — E66.3 OVERWEIGHT: ICD-10-CM

## 2025-01-21 DIAGNOSIS — I51.7 LVH (LEFT VENTRICULAR HYPERTROPHY): ICD-10-CM

## 2025-01-21 DIAGNOSIS — I10 ESSENTIAL HYPERTENSION: ICD-10-CM

## 2025-01-21 DIAGNOSIS — F40.00 AGORAPHOBIA: ICD-10-CM

## 2025-01-21 DIAGNOSIS — F41.9 ANXIETY: ICD-10-CM

## 2025-01-21 DIAGNOSIS — E78.2 MIXED HYPERLIPIDEMIA: Primary | ICD-10-CM

## 2025-01-21 DIAGNOSIS — F32.89 OTHER DEPRESSION: ICD-10-CM

## 2025-01-21 DIAGNOSIS — Z82.49 FAMILY HISTORY OF HEART DISEASE: ICD-10-CM

## 2025-01-21 DIAGNOSIS — Z00.01 ENCOUNTER FOR GENERAL ADULT MEDICAL EXAMINATION WITH ABNORMAL FINDINGS: ICD-10-CM

## 2025-01-21 PROBLEM — D22.9 MELANOCYTIC NEVUS: Status: RESOLVED | Noted: 2022-10-30 | Resolved: 2025-01-21

## 2025-01-21 PROCEDURE — 99214 OFFICE O/P EST MOD 30 MIN: CPT | Performed by: FAMILY MEDICINE

## 2025-01-21 PROCEDURE — 99395 PREV VISIT EST AGE 18-39: CPT | Performed by: FAMILY MEDICINE

## 2025-01-21 RX ORDER — LISINOPRIL 10 MG/1
10 TABLET ORAL DAILY
Qty: 90 TABLET | Refills: 3 | Status: SHIPPED | OUTPATIENT
Start: 2025-01-21

## 2025-01-21 RX ORDER — ATORVASTATIN CALCIUM 20 MG/1
20 TABLET, FILM COATED ORAL DAILY
Qty: 90 TABLET | Refills: 3 | Status: SHIPPED | OUTPATIENT
Start: 2025-01-21

## 2025-01-21 RX ORDER — CITALOPRAM HYDROBROMIDE 20 MG/1
20 TABLET ORAL DAILY
Qty: 90 TABLET | Refills: 3 | Status: SHIPPED | OUTPATIENT
Start: 2025-01-21

## 2025-01-21 NOTE — ASSESSMENT & PLAN NOTE
Resolved thus delete.  EKG done 1-, read by me, reviewed with pt.  EKG showed NSR with vent rate of 87, questionable LVH, doubt Ischemia.

## 2025-01-21 NOTE — PROGRESS NOTES
Subjective   Matt Moore is a 35 y.o. male here for his annual physical with me. Matt is here for coordination of medical care, to discuss health maintenance, disease prevention as well as to followup on medical problems. Patient has been followed by me since 2018. Patient's last CPE was 11-6-2023. Activity level is moderate. Exercises 3 per week. Appetite is good. Feels well with many complaints. Energy level is good. Sleeps well. Patient's last colonoscopy was never due to age. He is advised to have colonoscopy at age 47.  Patient is doing routine self skin exam monthly. Patient is doing routine self-breast exams monthly. Patient is checking testicles monthly.        History of Present Illness  Follow up Agoraphobia.    Follow up Anorgasmia.    Follow up overweight.  Hyperlipidemia  This is a chronic problem. The current episode started more than 1 year ago. The problem is controlled. Factors aggravating his hyperlipidemia include fatty foods. Pertinent negatives include no chest pain, myalgias or shortness of breath. Current antihyperlipidemic treatment includes statins. The current treatment provides moderate improvement of lipids.   Nasal Polyps  Symptoms are chronic.   Symptoms occur constantly.   Symptoms have been unchanged since onset.   Pertinent negative symptoms include no abdominal pain, no chest pain, no chills, no congestion, no cough, no fatigue, no fever, no myalgias, no nausea, no neck pain, no rash, no sore throat, no dysuria, no vomiting and no weakness.   Symptoms comment: nose bleeds.   Treatments tried include nothing.        The following portions of the patient's history were reviewed and updated as appropriate: allergies, current medications, past family history, past medical history, past social history, past surgical history, and problem list.    Past Medical History:   Diagnosis Date    Agoraphobia     Anxiety     HDL deficiency     Hyperlipidemia     Hypertension     Malaise and  fatigue     Near syncope     Overweight     Skin abnormality        Family History   Problem Relation Age of Onset    Coronary artery disease Mother 52        and/or Hypertension; valve repair at 52 living at 55.    Stroke Mother 52    Hyperlipidemia Mother         lipid in future    Heart disease Father         A-Fib    Hypertension Father     Other Father         detatched retna bilateral    Anxiety disorder Brother     Coronary artery disease Maternal Uncle         and/or Hypertension    Hypertension Maternal Grandmother     Diabetes Maternal Grandmother     Hypertension Maternal Grandfather     Colon cancer Paternal Grandmother     Diabetes Paternal Grandmother     Coronary artery disease Paternal Grandfather         and/or Hypertension       Social History     Socioeconomic History    Marital status: Single   Tobacco Use    Smoking status: Former     Current packs/day: 0.00     Average packs/day: 1 pack/day for 3.2 years (3.2 ttl pk-yrs)     Types: Cigarettes     Start date: 2016     Quit date: 2019     Years since quittin.6     Passive exposure: Current    Smokeless tobacco: Current     Types: Chew    Tobacco comments:     1 can daily   Vaping Use    Vaping status: Never Used   Substance and Sexual Activity    Alcohol use: Not Currently     Comment: Sober since     Drug use: Never    Sexual activity: Not Currently       Social History     Social History Narrative    Never , lives alone.  Works at [employer], since  is a  and works 48 hours weekly, mild stress.  Exercise cutting wood 1 x weekly for 4-6 hours and increased stair walking at work.  Caffeine Tea 60 oz daily and 20 oz soda daily.  Always wears seatbelts.  Hobbies hunting, fishing, riding motorcycles, archery and hunting trips, play video games.        History reviewed. No pertinent surgical history.    No current outpatient medications on file prior to visit.     No current facility-administered  "medications on file prior to visit.       No Known Allergies    Review of Systems   Constitutional:  Negative for appetite change, chills, fatigue, fever, unexpected weight gain and unexpected weight loss.   HENT:  Negative for congestion, dental problem, ear discharge, ear pain, hearing loss, nosebleeds, postnasal drip, rhinorrhea, sinus pressure, sneezing, sore throat, tinnitus and voice change.         Last dental exam 11-, Dr. Rodriguez-Doing well    Nasal polyp   Eyes:  Negative for blurred vision, double vision, pain, redness and visual disturbance.        Last vision exam 4-2023 with Dr. Martinez-advised to follow   Respiratory:  Negative for cough, shortness of breath, wheezing and stridor.    Cardiovascular:  Negative for chest pain, palpitations and leg swelling.   Gastrointestinal:  Negative for abdominal pain, anal bleeding, blood in stool, constipation, diarrhea, nausea, rectal pain, vomiting, GERD and indigestion.        7 BM weekly   Endocrine: Negative for cold intolerance, heat intolerance, polydipsia, polyphagia and polyuria.        Sex Drive  He is a  She is a   Genitourinary:  Negative for difficulty urinating, dysuria, frequency, hematuria and urgency.   Musculoskeletal:  Negative for back pain, joint swelling, myalgias, neck pain and neck stiffness.   Skin:  Negative for color change, dry skin and rash.   Neurological:  Negative for dizziness, syncope, speech difficulty, weakness, light-headedness, headache and memory problem.   Hematological:  Does not bruise/bleed easily.   Psychiatric/Behavioral:  Negative for decreased concentration, sleep disturbance, depressed mood and stress. The patient is not nervous/anxious.        Objective   Visit Vitals  /86 (BP Location: Left arm, Patient Position: Sitting, Cuff Size: Adult)   Pulse 97   Temp 97.1 °F (36.2 °C) (Temporal)   Resp 18   Ht 177.8 cm (70\")   Wt 97.9 kg (215 lb 14.4 oz)   SpO2 97%   BMI 30.98 kg/m²     Physical " Exam  Constitutional:       General: He is not in acute distress.     Appearance: He is well-developed. He is not diaphoretic.   HENT:      Head: Normocephalic.      Right Ear: Hearing, tympanic membrane, ear canal and external ear normal.      Left Ear: Hearing, tympanic membrane, ear canal and external ear normal.      Nose: Nose normal.      Comments: Moderate right nasal polyp     Mouth/Throat:      Lips: Pink.      Mouth: Mucous membranes are moist.      Pharynx: Oropharynx is clear. No oropharyngeal exudate.   Eyes:      General: Lids are normal. No scleral icterus.        Right eye: No discharge.         Left eye: No discharge.      Extraocular Movements: Extraocular movements intact.      Conjunctiva/sclera: Conjunctivae normal.      Pupils: Pupils are equal, round, and reactive to light.   Neck:      Trachea: Trachea normal.   Cardiovascular:      Rate and Rhythm: Normal rate and regular rhythm.      Heart sounds: Normal heart sounds. No murmur heard.  Pulmonary:      Effort: Pulmonary effort is normal.      Breath sounds: Normal breath sounds. No wheezing.   Chest:      Chest wall: No tenderness.   Breasts:     Right: Normal. No swelling, bleeding, inverted nipple, mass, nipple discharge, skin change or tenderness.      Left: Normal. No swelling, bleeding, inverted nipple, mass, nipple discharge, skin change or tenderness.   Abdominal:      General: Bowel sounds are normal. There is no distension.      Palpations: Abdomen is soft. There is no mass.      Tenderness: There is no abdominal tenderness.      Hernia: No hernia is present.   Genitourinary:     Penis: Normal and circumcised. No tenderness.       Testes: Normal.      Comments: No prostate exam done due to age  Musculoskeletal:         General: No tenderness or deformity. Normal range of motion.      Cervical back: Full passive range of motion without pain, normal range of motion and neck supple.   Lymphadenopathy:      Cervical: No cervical  adenopathy.   Skin:     General: Skin is warm and dry.      Findings: No erythema or rash.      Comments: Nevus's scattered over the body.   Neurological:      General: No focal deficit present.      Mental Status: He is alert and oriented to person, place, and time.      Cranial Nerves: Cranial nerves 2-12 are intact. No cranial nerve deficit.      Sensory: Sensation is intact. No sensory deficit.      Motor: Motor function is intact. No abnormal muscle tone.      Coordination: Coordination is intact. Coordination normal.      Gait: Gait is intact.      Deep Tendon Reflexes: Reflexes normal.   Psychiatric:         Behavior: Behavior normal.         Thought Content: Thought content normal.         Judgment: Judgment normal.         Assessment & Plan   Problem List Items Addressed This Visit          Medium    Agoraphobia    Current Assessment & Plan     Doing well.  Patient tolerated Celexa well without side effects. I feel the benefits of the medication outweigh the risks.          Relevant Medications    citalopram (CeleXA) 20 MG tablet    Anxiety    Current Assessment & Plan     Doing well.  Patient tolerated Celexa well without side effects. I feel the benefits of the medication outweigh the risks.          Relevant Medications    citalopram (CeleXA) 20 MG tablet    Essential hypertension    Current Assessment & Plan     Doing well.  Patient tolerated Lisinopril well without side effects. I feel the benefits of the medication outweigh the risks.   Encouraged to watch salt, exercise more and lose weight.           Relevant Medications    lisinopril (PRINIVIL,ZESTRIL) 10 MG tablet    Mixed hyperlipidemia - Primary    Current Assessment & Plan     Lipid and CMP done 1-, read by me, reviewed with pt.  Trig. 120 down from 232, Tot. Chol. 112 down from 123, HDL 28 up from 27, LDL 62 up from 58  Improved  Encouraged to watch fatty intake, exercise more, and lose weight.   Compliant with medication.  Patient  tolerated Lipitor well without side effects. I feel the benefits of the medication outweigh the risks.    Is not getting adequate diet and exercise  Goals developed at last visit were not met close to goal except for HDL  Follow up in 6  months  Care management needs are self-addressed. Self-management abilities addressed and patient is capable of managing his own disease.           Relevant Medications    atorvastatin (LIPITOR) 20 MG tablet    Other Relevant Orders    Lipid Panel With / Chol / HDL Ratio    Comprehensive Metabolic Panel       Low    RESOLVED: Melanocytic nevus    Overview     Right cheek -- he is to watch for changes         Current Assessment & Plan     Resolved thus delete.            Unprioritized    Anorgasmia of male    Current Assessment & Plan     Due to Celexa         Relevant Medications    citalopram (CeleXA) 20 MG tablet    Encounter for general adult medical examination with abnormal findings    Overview                Current Assessment & Plan     Encouraged to do self-breast exam, self-testicle exams, and self derm exams. Congratulated on using seat belts.  Encouraged to do annual physical exams.  Immunization status reviewed.           Family history of heart disease    Overview     Mother in her 50s and uncles in their 60s.  Patient strong encouraged to lower risk factors         Current Assessment & Plan     Advised patient to keep risk factors low and start an Aspirin 81mg daily.          RESOLVED: Intraventricular conduction delay    Current Assessment & Plan     Resolved thus delete.  EKG done 1-, read by me, reviewed with pt.  EKG showed NSR with vent rate of 87, questionable LVH, doubt Ischemia.         RESOLVED: Lethargy    Current Assessment & Plan     Mild.  Normal actively level thus delete.  CBC and TSH done 1-, read by me, reviewed with pt.  TSH was 1.070 up from 0.772, CBC was normal         LVH (left ventricular hypertrophy)    Overview     Mild  Echo done  1-29-21  EF was 60-65%         Current Assessment & Plan     Doing well         Nasal polyp    Current Assessment & Plan     Stable, advised referral to ENT, pt. Agrees, referral sent to Dr. Arnold         Relevant Orders    Ambulatory Referral to ENT (Otolaryngology) (Completed)    Overweight    Current Assessment & Plan     Patient's (Body mass index is 30.98 kg/m².) indicates that they are overweight with health conditions that include hypertension and dyslipidemias . Weight is improving with lifestyle modifications. BMI is above average; BMI management plan is completed. We discussed portion control and increasing exercise.          RESOLVED: Sinus arrhythmia    Overview     Found on EKG January 12, 2021 will follow conservatively present.         Current Assessment & Plan     Resolved thus delete.  EKG done 1-, read by me, reviewed with pt.  EKG showed NSR with vent rate of 87, questionable LVH, doubt Ischemia.          Other Visit Diagnoses       Other depression        Relevant Medications    citalopram (CeleXA) 20 MG tablet                      Encouraged to check his skin, testicles and breasts monthly. Reviewed exercising regularly, eating a balanced diet, immunizations and if due, patient counselled to check with insurance company for coverage;, and regularly checking skin and breasts.

## 2025-01-21 NOTE — ASSESSMENT & PLAN NOTE
Patient's (Body mass index is 30.98 kg/m².) indicates that they are overweight with health conditions that include hypertension and dyslipidemias . Weight is improving with lifestyle modifications. BMI is above average; BMI management plan is completed. We discussed portion control and increasing exercise.

## 2025-08-09 LAB
ALBUMIN SERPL-MCNC: 4.9 G/DL (ref 4.1–5.1)
ALP SERPL-CCNC: 78 IU/L (ref 44–121)
ALT SERPL-CCNC: 31 IU/L (ref 0–44)
AST SERPL-CCNC: 27 IU/L (ref 0–40)
BILIRUB SERPL-MCNC: 0.6 MG/DL (ref 0–1.2)
BUN SERPL-MCNC: 10 MG/DL (ref 6–20)
BUN/CREAT SERPL: 10 (ref 9–20)
CALCIUM SERPL-MCNC: 9.9 MG/DL (ref 8.7–10.2)
CHLORIDE SERPL-SCNC: 101 MMOL/L (ref 96–106)
CHOLEST SERPL-MCNC: 133 MG/DL (ref 100–199)
CHOLEST/HDLC SERPL: 4 RATIO (ref 0–5)
CO2 SERPL-SCNC: 22 MMOL/L (ref 20–29)
CREAT SERPL-MCNC: 1.02 MG/DL (ref 0.76–1.27)
EGFRCR SERPLBLD CKD-EPI 2021: 100 ML/MIN/1.73
GLOBULIN SER CALC-MCNC: 2.6 G/DL (ref 1.5–4.5)
GLUCOSE SERPL-MCNC: 89 MG/DL (ref 70–99)
HDLC SERPL-MCNC: 33 MG/DL
LDLC SERPL CALC-MCNC: 79 MG/DL (ref 0–99)
POTASSIUM SERPL-SCNC: 4 MMOL/L (ref 3.5–5.2)
PROT SERPL-MCNC: 7.5 G/DL (ref 6–8.5)
SODIUM SERPL-SCNC: 142 MMOL/L (ref 134–144)
TRIGL SERPL-MCNC: 116 MG/DL (ref 0–149)
VLDLC SERPL CALC-MCNC: 21 MG/DL (ref 5–40)

## 2025-08-13 ENCOUNTER — OFFICE VISIT (OUTPATIENT)
Dept: FAMILY MEDICINE CLINIC | Facility: CLINIC | Age: 35
End: 2025-08-13
Payer: COMMERCIAL

## 2025-08-13 VITALS
BODY MASS INDEX: 29.98 KG/M2 | HEART RATE: 69 BPM | SYSTOLIC BLOOD PRESSURE: 118 MMHG | RESPIRATION RATE: 14 BRPM | DIASTOLIC BLOOD PRESSURE: 84 MMHG | TEMPERATURE: 97.1 F | WEIGHT: 209.4 LBS | HEIGHT: 70 IN | OXYGEN SATURATION: 98 %

## 2025-08-13 DIAGNOSIS — E78.2 MIXED HYPERLIPIDEMIA: Primary | ICD-10-CM

## 2025-08-13 DIAGNOSIS — E66.3 OVERWEIGHT: ICD-10-CM

## 2025-08-13 DIAGNOSIS — I10 ESSENTIAL HYPERTENSION: ICD-10-CM
